# Patient Record
Sex: FEMALE | Race: WHITE | NOT HISPANIC OR LATINO | Employment: OTHER | URBAN - METROPOLITAN AREA
[De-identification: names, ages, dates, MRNs, and addresses within clinical notes are randomized per-mention and may not be internally consistent; named-entity substitution may affect disease eponyms.]

---

## 2017-07-10 DIAGNOSIS — Z12.31 VISIT FOR SCREENING MAMMOGRAM: Primary | ICD-10-CM

## 2017-07-18 ENCOUNTER — HOSPITAL ENCOUNTER (OUTPATIENT)
Dept: RADIOLOGY | Facility: HOSPITAL | Age: 67
Discharge: HOME | End: 2017-07-18
Attending: OBSTETRICS & GYNECOLOGY
Payer: MEDICARE

## 2017-07-18 VITALS — HEIGHT: 62 IN | BODY MASS INDEX: 34.96 KG/M2 | WEIGHT: 190 LBS

## 2017-07-18 DIAGNOSIS — Z12.31 VISIT FOR SCREENING MAMMOGRAM: ICD-10-CM

## 2017-07-18 PROCEDURE — 77067 SCR MAMMO BI INCL CAD: CPT | Mod: TC

## 2017-07-18 PROCEDURE — 77063 BREAST TOMOSYNTHESIS BI: CPT | Mod: 26,,, | Performed by: RADIOLOGY

## 2017-07-18 PROCEDURE — 77067 SCR MAMMO BI INCL CAD: CPT | Mod: 26,,, | Performed by: RADIOLOGY

## 2018-08-28 DIAGNOSIS — Z12.31 VISIT FOR SCREENING MAMMOGRAM: Primary | ICD-10-CM

## 2018-10-15 ENCOUNTER — HOSPITAL ENCOUNTER (OUTPATIENT)
Dept: RADIOLOGY | Facility: HOSPITAL | Age: 68
Discharge: HOME | End: 2018-10-15
Attending: OBSTETRICS & GYNECOLOGY
Payer: MEDICARE

## 2018-10-15 DIAGNOSIS — Z12.31 VISIT FOR SCREENING MAMMOGRAM: ICD-10-CM

## 2018-10-15 PROCEDURE — 77063 BREAST TOMOSYNTHESIS BI: CPT | Mod: 26,,, | Performed by: RADIOLOGY

## 2018-10-15 PROCEDURE — 77063 BREAST TOMOSYNTHESIS BI: CPT | Mod: TC

## 2018-10-15 PROCEDURE — 77067 SCR MAMMO BI INCL CAD: CPT | Mod: 26,,, | Performed by: RADIOLOGY

## 2018-10-15 PROCEDURE — 77067 SCR MAMMO BI INCL CAD: CPT | Mod: TC

## 2019-09-18 DIAGNOSIS — Z12.39 BREAST SCREENING: Primary | ICD-10-CM

## 2019-10-17 ENCOUNTER — HOSPITAL ENCOUNTER (OUTPATIENT)
Dept: RADIOLOGY | Facility: HOSPITAL | Age: 69
Discharge: HOME | End: 2019-10-17
Attending: OBSTETRICS & GYNECOLOGY
Payer: MEDICARE

## 2019-10-17 VITALS — WEIGHT: 190.06 LBS | BODY MASS INDEX: 34.76 KG/M2

## 2019-10-17 DIAGNOSIS — Z12.39 BREAST SCREENING: ICD-10-CM

## 2019-10-17 PROCEDURE — 77067 MAMMO DIGITAL SCREENING BILAT WITH CAD: ICD-10-PCS | Mod: 26,,, | Performed by: RADIOLOGY

## 2019-10-17 PROCEDURE — 77067 SCR MAMMO BI INCL CAD: CPT | Mod: TC

## 2019-10-17 PROCEDURE — 77067 SCR MAMMO BI INCL CAD: CPT | Mod: 26,,, | Performed by: RADIOLOGY

## 2020-10-02 DIAGNOSIS — Z12.31 ENCOUNTER FOR SCREENING MAMMOGRAM FOR MALIGNANT NEOPLASM OF BREAST: Primary | ICD-10-CM

## 2020-10-22 ENCOUNTER — HOSPITAL ENCOUNTER (OUTPATIENT)
Dept: RADIOLOGY | Facility: HOSPITAL | Age: 70
Discharge: HOME | End: 2020-10-22
Payer: MEDICARE

## 2020-10-22 VITALS — WEIGHT: 190 LBS | BODY MASS INDEX: 34.75 KG/M2

## 2020-10-22 DIAGNOSIS — Z12.31 ENCOUNTER FOR SCREENING MAMMOGRAM FOR MALIGNANT NEOPLASM OF BREAST: ICD-10-CM

## 2020-10-22 PROCEDURE — 77067 SCR MAMMO BI INCL CAD: CPT | Mod: TC

## 2020-10-22 PROCEDURE — 77063 MAMMO DIGITAL SCREENING BILAT WITH TOMO: ICD-10-PCS | Mod: 26,,, | Performed by: RADIOLOGY

## 2020-10-22 PROCEDURE — 77063 BREAST TOMOSYNTHESIS BI: CPT | Mod: 26,,, | Performed by: RADIOLOGY

## 2020-10-22 PROCEDURE — 77067 MAMMO DIGITAL SCREENING BILAT WITH TOMO: ICD-10-PCS | Mod: 26,,, | Performed by: RADIOLOGY

## 2020-10-22 PROCEDURE — 77067 SCR MAMMO BI INCL CAD: CPT | Mod: 26,,, | Performed by: RADIOLOGY

## 2021-09-23 DIAGNOSIS — Z12.31 ENCOUNTER FOR SCREENING MAMMOGRAM FOR MALIGNANT NEOPLASM OF BREAST: Primary | ICD-10-CM

## 2021-09-24 ENCOUNTER — TELEPHONE (OUTPATIENT)
Dept: OBSTETRICS AND GYNECOLOGY | Facility: CLINIC | Age: 71
End: 2021-09-24

## 2021-11-02 ENCOUNTER — HOSPITAL ENCOUNTER (OUTPATIENT)
Dept: RADIOLOGY | Facility: HOSPITAL | Age: 71
Discharge: HOME | End: 2021-11-02
Attending: INTERNAL MEDICINE
Payer: MEDICARE

## 2021-11-02 DIAGNOSIS — Z12.31 ENCOUNTER FOR SCREENING MAMMOGRAM FOR MALIGNANT NEOPLASM OF BREAST: ICD-10-CM

## 2021-11-02 PROCEDURE — 77067 SCR MAMMO BI INCL CAD: CPT | Mod: TC

## 2021-11-02 PROCEDURE — 77063 MAMMO DIGITAL SCREENING BILAT WITH TOMO: ICD-10-PCS | Mod: 26,,, | Performed by: RADIOLOGY

## 2021-11-02 PROCEDURE — 77067 SCR MAMMO BI INCL CAD: CPT | Mod: 26,,, | Performed by: RADIOLOGY

## 2021-11-02 PROCEDURE — 77067 MAMMO DIGITAL SCREENING BILAT WITH TOMO: ICD-10-PCS | Mod: 26,,, | Performed by: RADIOLOGY

## 2021-11-02 PROCEDURE — 77063 BREAST TOMOSYNTHESIS BI: CPT | Mod: 26,,, | Performed by: RADIOLOGY

## 2022-09-16 LAB — NONINV COLON CA DNA+OCC BLD SCRN STL QL: NEGATIVE

## 2022-11-08 LAB
LEFT EYE DM RETINOPATHY: NEGATIVE
RIGHT EYE DM RETINOPATHY: NEGATIVE

## 2022-11-09 ENCOUNTER — HOSPITAL ENCOUNTER (OUTPATIENT)
Dept: RADIOLOGY | Facility: HOSPITAL | Age: 72
Discharge: HOME | End: 2022-11-09
Payer: MEDICARE

## 2022-11-09 VITALS — WEIGHT: 185 LBS | HEIGHT: 61 IN | BODY MASS INDEX: 34.93 KG/M2

## 2022-11-09 DIAGNOSIS — Z12.31 BREAST CANCER SCREENING BY MAMMOGRAM: ICD-10-CM

## 2022-11-09 PROCEDURE — 77067 MAMMO DIGITAL SCREENING BILAT WITH TOMO: ICD-10-PCS | Mod: 26,,, | Performed by: RADIOLOGY

## 2022-11-09 PROCEDURE — 77063 MAMMO DIGITAL SCREENING BILAT WITH TOMO: ICD-10-PCS | Mod: 26,,, | Performed by: RADIOLOGY

## 2022-11-09 PROCEDURE — 77067 SCR MAMMO BI INCL CAD: CPT | Mod: 26,,, | Performed by: RADIOLOGY

## 2022-11-09 PROCEDURE — 77063 BREAST TOMOSYNTHESIS BI: CPT | Mod: TC

## 2022-11-09 PROCEDURE — 77063 BREAST TOMOSYNTHESIS BI: CPT | Mod: 26,,, | Performed by: RADIOLOGY

## 2022-12-30 ENCOUNTER — TELEPHONE (OUTPATIENT)
Dept: PRIMARY CARE CLINIC | Facility: CLINIC | Age: 72
End: 2022-12-30

## 2022-12-30 RX ORDER — PRAVASTATIN SODIUM 40 MG/1
TABLET ORAL
Status: CANCELLED | OUTPATIENT
Start: 2022-12-30

## 2022-12-30 RX ORDER — PRAVASTATIN SODIUM 40 MG/1
TABLET ORAL
COMMUNITY
Start: 2021-12-07 | End: 2024-02-14

## 2022-12-30 NOTE — TELEPHONE ENCOUNTER
----- Message from Gloria Neri sent at 12/29/2022  3:03 PM CST -----  Contact: pt  Type:  RX Refill Request  Second Request For Meds       Who Called:  pt  Refill or New Rx:  refill  RX Name and Strength:  pravaston   How is the patient currently taking it? (ex. 1XDay):  as order  Is this a 30 day or 90 day RX:  90  Preferred Pharmacy with phone number:    [id number] [place], [place] - [address] AT [address]   Phone:  [phone]  Fax:  [phone]        Local or Mail Order:  local  Ordering Provider:  rosalva Acosta Call Back Number:  [phone]    Additional Information:

## 2022-12-30 NOTE — TELEPHONE ENCOUNTER
----- Message from Gloria Neri sent at 12/29/2022  3:03 PM CST -----  Contact: pt  Type:  RX Refill Request  Second Request For Meds       Who Called:  pt  Refill or New Rx:  refill  RX Name and Strength:  pravaston   How is the patient currently taking it? (ex. 1XDay):  as order  Is this a 30 day or 90 day RX:  90  Preferred Pharmacy with phone number:    [id number] [place], [place] - [address] AT [address]   Phone:  [phone]  Fax:  [phone]        Local or Mail Order:  local  Ordering Provider:  rosalva Acosta Call Back Number:  [phone]    Additional Information:

## 2023-01-10 ENCOUNTER — OFFICE VISIT (OUTPATIENT)
Dept: PRIMARY CARE CLINIC | Facility: CLINIC | Age: 73
End: 2023-01-10
Payer: MEDICARE

## 2023-01-10 VITALS
TEMPERATURE: 98 F | HEIGHT: 61 IN | OXYGEN SATURATION: 97 % | DIASTOLIC BLOOD PRESSURE: 64 MMHG | HEART RATE: 65 BPM | BODY MASS INDEX: 36.82 KG/M2 | WEIGHT: 195 LBS | SYSTOLIC BLOOD PRESSURE: 128 MMHG

## 2023-01-10 DIAGNOSIS — E11.00 DM HYPEROSMOLARITY TYPE II, UNCONTROLLED: ICD-10-CM

## 2023-01-10 DIAGNOSIS — E11.65 INADEQUATELY CONTROLLED DIABETES MELLITUS: ICD-10-CM

## 2023-01-10 DIAGNOSIS — N18.31 TYPE 2 DIABETES MELLITUS WITH STAGE 3A CHRONIC KIDNEY DISEASE, WITHOUT LONG-TERM CURRENT USE OF INSULIN: ICD-10-CM

## 2023-01-10 DIAGNOSIS — E11.22 TYPE 2 DIABETES MELLITUS WITH STAGE 3A CHRONIC KIDNEY DISEASE, WITHOUT LONG-TERM CURRENT USE OF INSULIN: ICD-10-CM

## 2023-01-10 DIAGNOSIS — I10 ESSENTIAL HYPERTENSION, BENIGN: Primary | ICD-10-CM

## 2023-01-10 DIAGNOSIS — E78.2 MIXED HYPERLIPIDEMIA: ICD-10-CM

## 2023-01-10 PROCEDURE — 99214 PR OFFICE/OUTPT VISIT, EST, LEVL IV, 30-39 MIN: ICD-10-PCS | Mod: S$GLB,,, | Performed by: INTERNAL MEDICINE

## 2023-01-10 PROCEDURE — 99214 OFFICE O/P EST MOD 30 MIN: CPT | Mod: S$GLB,,, | Performed by: INTERNAL MEDICINE

## 2023-01-10 RX ORDER — LOSARTAN POTASSIUM 50 MG/1
50 TABLET ORAL DAILY
COMMUNITY

## 2023-01-10 RX ORDER — EZETIMIBE 10 MG/1
10 TABLET ORAL NIGHTLY
COMMUNITY
End: 2023-03-01 | Stop reason: SDUPTHER

## 2023-01-10 RX ORDER — DULAGLUTIDE 1.5 MG/.5ML
1.5 INJECTION, SOLUTION SUBCUTANEOUS
Qty: 12 PEN | Refills: 3 | Status: SHIPPED | OUTPATIENT
Start: 2023-01-10 | End: 2023-04-18

## 2023-01-10 RX ORDER — METOPROLOL SUCCINATE 100 MG/1
100 TABLET, EXTENDED RELEASE ORAL DAILY
COMMUNITY
End: 2023-09-13 | Stop reason: SDUPTHER

## 2023-01-10 RX ORDER — TORSEMIDE 20 MG/1
10 TABLET ORAL DAILY
COMMUNITY

## 2023-01-10 RX ORDER — PHENTERMINE HYDROCHLORIDE 37.5 MG/1
37.5 TABLET ORAL
Qty: 30 TABLET | Refills: 0 | Status: SHIPPED | OUTPATIENT
Start: 2023-01-10 | End: 2023-01-12 | Stop reason: SDUPTHER

## 2023-01-10 RX ORDER — METFORMIN HYDROCHLORIDE 500 MG/1
500 TABLET, EXTENDED RELEASE ORAL
COMMUNITY
End: 2023-01-20 | Stop reason: SDUPTHER

## 2023-01-10 RX ORDER — ROPINIROLE 4 MG/1
4 TABLET, FILM COATED ORAL NIGHTLY
COMMUNITY
End: 2023-09-05 | Stop reason: SDUPTHER

## 2023-01-10 RX ORDER — LEVOTHYROXINE SODIUM 75 UG/1
75 TABLET ORAL
COMMUNITY
End: 2023-06-07 | Stop reason: SDUPTHER

## 2023-01-10 RX ORDER — POTASSIUM CHLORIDE 20 MEQ/1
20 TABLET, EXTENDED RELEASE ORAL 2 TIMES DAILY
COMMUNITY
End: 2023-09-13 | Stop reason: SDUPTHER

## 2023-01-10 NOTE — ASSESSMENT & PLAN NOTE
T/c changing SGL-2 inhibitor to farxiga if insurance covers  Diet with less salt d/w pt  Dec use of NSAIDs, inc intake of fluids,...

## 2023-01-10 NOTE — PROGRESS NOTES
Subjective:       Patient ID: Linda Gannon Mucha is a 72 y.o. female.    Chief Complaint: Medication Refill (Patient presents for medication clarity. No other complaints at this time.)      Medication Refill  This is a chronic problem. The current episode started more than 1 year ago. The problem has been gradually improving. Associated symptoms include arthralgias and myalgias. Pertinent negatives include no fever. Treatments tried: GLP-1 agonist. The treatment provided significant relief.   Diabetes  She presents for her follow-up diabetic visit. She has type 2 diabetes mellitus. No MedicAlert identification noted. The initial diagnosis of diabetes was made 10 years ago. Her disease course has been improving. There are no hypoglycemic associated symptoms. There are no hypoglycemic complications. Symptoms are improving. Diabetic complications include nephropathy. Risk factors for coronary artery disease include diabetes mellitus, dyslipidemia, obesity, hypertension, sedentary lifestyle and stress. Current diabetic treatment includes oral agent (dual therapy) (Ozempic, mounjaro).   Review of Systems   Constitutional:  Negative for activity change, fever and unexpected weight change.   Respiratory: Negative.     Cardiovascular: Negative.    Musculoskeletal:  Positive for arthralgias and myalgias.   Neurological: Negative.        Objective:      Physical Exam  Constitutional:       Appearance: Normal appearance. She is obese.   Cardiovascular:      Rate and Rhythm: Normal rate and regular rhythm.   Pulmonary:      Effort: Pulmonary effort is normal.      Breath sounds: Normal breath sounds.   Musculoskeletal:         General: Swelling: of knees.   Neurological:      Mental Status: She is alert.       Assessment:       1. Inadequately controlled diabetes mellitus  Overview:  In general , A1c is under good control but she suffers from diabetic nephropathy    Assessment & Plan:  T/c changing SGL-2 inhibitor to farxiga if  insurance covers  Diet with less salt d/w pt  Dec use of NSAIDs, inc intake of fluids,...      Other orders  -     dulaglutide (TRULICITY) 1.5 mg/0.5 mL pen injector; Inject 1.5 mg into the skin every 7 days.  Dispense: 12 pen; Refill: 3  -     phentermine (ADIPEX-P) 37.5 mg tablet; Take 1 tablet (37.5 mg total) by mouth before breakfast.  Dispense: 30 tablet; Refill: 0             Plan:       1. Inadequately controlled diabetes mellitus  Overview:  In general , A1c is under good control but she suffers from diabetic nephropathy    Assessment & Plan:  T/c changing SGL-2 inhibitor to farxiga if insurance covers  Diet with less salt d/w pt  Dec use of NSAIDs, inc intake of fluids,...      Other orders  -     dulaglutide (TRULICITY) 1.5 mg/0.5 mL pen injector; Inject 1.5 mg into the skin every 7 days.  Dispense: 12 pen; Refill: 3  -     phentermine (ADIPEX-P) 37.5 mg tablet; Take 1 tablet (37.5 mg total) by mouth before breakfast.  Dispense: 30 tablet; Refill: 0

## 2023-01-11 ENCOUNTER — PATIENT MESSAGE (OUTPATIENT)
Dept: ADMINISTRATIVE | Facility: HOSPITAL | Age: 73
End: 2023-01-11
Payer: MEDICARE

## 2023-01-12 RX ORDER — PHENTERMINE HYDROCHLORIDE 37.5 MG/1
37.5 TABLET ORAL
Qty: 30 TABLET | Refills: 0 | Status: SHIPPED | OUTPATIENT
Start: 2023-01-12 | End: 2023-02-11

## 2023-01-20 ENCOUNTER — LAB VISIT (OUTPATIENT)
Dept: LAB | Facility: CLINIC | Age: 73
End: 2023-01-20
Payer: MEDICARE

## 2023-01-20 DIAGNOSIS — E11.22 TYPE 2 DIABETES MELLITUS WITH STAGE 3A CHRONIC KIDNEY DISEASE, WITHOUT LONG-TERM CURRENT USE OF INSULIN: ICD-10-CM

## 2023-01-20 DIAGNOSIS — E11.00 DM HYPEROSMOLARITY TYPE II, UNCONTROLLED: ICD-10-CM

## 2023-01-20 DIAGNOSIS — I10 ESSENTIAL HYPERTENSION, BENIGN: ICD-10-CM

## 2023-01-20 DIAGNOSIS — N18.31 TYPE 2 DIABETES MELLITUS WITH STAGE 3A CHRONIC KIDNEY DISEASE, WITHOUT LONG-TERM CURRENT USE OF INSULIN: ICD-10-CM

## 2023-01-20 DIAGNOSIS — E78.2 MIXED HYPERLIPIDEMIA: ICD-10-CM

## 2023-01-20 LAB
ALBUMIN SERPL BCP-MCNC: 4 G/DL (ref 3.5–5.2)
ALBUMIN/CREAT UR: 14.2 UG/MG (ref 0–30)
ALP SERPL-CCNC: 139 U/L (ref 55–135)
ALT SERPL W/O P-5'-P-CCNC: 31 U/L (ref 10–44)
ANION GAP SERPL CALC-SCNC: 9 MMOL/L (ref 8–16)
AST SERPL-CCNC: 18 U/L (ref 10–40)
BILIRUB SERPL-MCNC: 1 MG/DL (ref 0.1–1)
BUN SERPL-MCNC: 27 MG/DL (ref 8–23)
CALCIUM SERPL-MCNC: 9.6 MG/DL (ref 8.7–10.5)
CHLORIDE SERPL-SCNC: 104 MMOL/L (ref 95–110)
CHOLEST SERPL-MCNC: 159 MG/DL (ref 120–199)
CHOLEST/HDLC SERPL: 2.4 {RATIO} (ref 2–5)
CO2 SERPL-SCNC: 26 MMOL/L (ref 23–29)
CREAT SERPL-MCNC: 1.1 MG/DL (ref 0.5–1.4)
CREAT UR-MCNC: 106 MG/DL (ref 15–325)
EST. GFR  (NO RACE VARIABLE): 52.7 ML/MIN/1.73 M^2
ESTIMATED AVG GLUCOSE: 137 MG/DL (ref 68–131)
GLUCOSE SERPL-MCNC: 94 MG/DL (ref 70–110)
HBA1C MFR BLD: 6.4 % (ref 4–5.6)
HDLC SERPL-MCNC: 66 MG/DL (ref 40–75)
HDLC SERPL: 41.5 % (ref 20–50)
LDLC SERPL CALC-MCNC: 70.4 MG/DL (ref 63–159)
MICROALBUMIN UR DL<=1MG/L-MCNC: 15 UG/ML
NONHDLC SERPL-MCNC: 93 MG/DL
POTASSIUM SERPL-SCNC: 4.2 MMOL/L (ref 3.5–5.1)
PROT SERPL-MCNC: 7.9 G/DL (ref 6–8.4)
SODIUM SERPL-SCNC: 139 MMOL/L (ref 136–145)
TRIGL SERPL-MCNC: 113 MG/DL (ref 30–150)

## 2023-01-20 PROCEDURE — 36415 COLL VENOUS BLD VENIPUNCTURE: CPT | Mod: ,,, | Performed by: STUDENT IN AN ORGANIZED HEALTH CARE EDUCATION/TRAINING PROGRAM

## 2023-01-20 PROCEDURE — 80061 LIPID PANEL: CPT | Performed by: INTERNAL MEDICINE

## 2023-01-20 PROCEDURE — 83036 HEMOGLOBIN GLYCOSYLATED A1C: CPT | Performed by: INTERNAL MEDICINE

## 2023-01-20 PROCEDURE — 82570 ASSAY OF URINE CREATININE: CPT | Performed by: INTERNAL MEDICINE

## 2023-01-20 PROCEDURE — 80053 COMPREHEN METABOLIC PANEL: CPT | Performed by: INTERNAL MEDICINE

## 2023-01-20 PROCEDURE — 36415 PR COLLECTION VENOUS BLOOD,VENIPUNCTURE: ICD-10-PCS | Mod: ,,, | Performed by: STUDENT IN AN ORGANIZED HEALTH CARE EDUCATION/TRAINING PROGRAM

## 2023-01-20 RX ORDER — METFORMIN HYDROCHLORIDE 500 MG/1
500 TABLET, EXTENDED RELEASE ORAL
Qty: 90 TABLET | Refills: 3 | Status: SHIPPED | OUTPATIENT
Start: 2023-01-20 | End: 2023-09-13 | Stop reason: SDUPTHER

## 2023-01-20 NOTE — TELEPHONE ENCOUNTER
----- Message from Dorita Flores sent at 1/19/2023  3:10 PM CST -----  Contact: pt  Type:  RX Refill Request    Who Called:  pharm  Refill or New Rx:  new  RX Name and Strength:    metFORMIN (GLUCOPHAGE-XR) 500 MG ER 24hr tablet  How is the patient currently taking it? (ex. 1XDay):  as ordered  Is this a 30 day or 90 day RX:  90  Preferred Pharmacy with phone number:  [facility] - [address] [address]  Phone: [phone] Fax: [phone]    Local or Mail Order:  mail    Ordering Provider:  rosalva Acosta Call Back Number:  [phone]  Additional Information:  they are needing new rx due to provider moved to new location

## 2023-03-01 DIAGNOSIS — E78.2 MIXED HYPERLIPIDEMIA: Primary | ICD-10-CM

## 2023-03-01 RX ORDER — EZETIMIBE 10 MG/1
10 TABLET ORAL NIGHTLY
Qty: 90 TABLET | Refills: 1 | Status: SHIPPED | OUTPATIENT
Start: 2023-03-01 | End: 2023-03-02 | Stop reason: SDUPTHER

## 2023-03-01 NOTE — TELEPHONE ENCOUNTER
----- Message from Deana Soto sent at 3/1/2023  9:04 AM CST -----  Contact: pharm  Type:  RX Refill Request    Who [facility]  REF#[id number]    Refill or New Rx:  refill-- they are requesting medication within 48hrs please they are asking     RX Name and Strength:      1. ezetimibe (ZETIA) 10 mg tablet     No  Sig - Route: Take 10 mg by mouth every evening. - Oral      How is the patient currently taking it? (ex. 1XDay):  as order  Is this a 30 day or 90 day RX:  as order  Preferred Pharmacy with phone number:      [facility] - [address] [address]  Phone: [phone] Fax: [phone]          Local or Mail Order:  mail  Ordering Provider:  becka Acosta Call Back Number:  [phone]  Additional Information:

## 2023-03-02 DIAGNOSIS — E78.2 MIXED HYPERLIPIDEMIA: ICD-10-CM

## 2023-03-02 RX ORDER — EZETIMIBE 10 MG/1
10 TABLET ORAL NIGHTLY
Qty: 90 TABLET | Refills: 1 | Status: SHIPPED | OUTPATIENT
Start: 2023-03-02 | End: 2023-08-15 | Stop reason: SDUPTHER

## 2023-03-02 NOTE — TELEPHONE ENCOUNTER
----- Message from Julieta Keyes sent at 3/2/2023  9:08 AM CST -----  Contact: patient  Type:  Needs Medical Advice    Who Called: Patient      Pharmacy name and phone #:        [facility] #[id number] - [place], [place] - [address] AT [address]  [address]  [place] [address]  Phone: [phone] Fax: [phone]      Would the patient rather a call back or a response via [facility]? Call    Best Call Back Number: [phone]    Additional Information: Patient is requesting a short term refill for the meds since she has already been two weeks without it and is waiting on a mail in service to mail them. Please call to advise       Med in question   ezetimibe (ZETIA) 10 mg tablet

## 2023-03-03 ENCOUNTER — OFFICE VISIT (OUTPATIENT)
Dept: PRIMARY CARE CLINIC | Facility: CLINIC | Age: 73
End: 2023-03-03
Payer: MEDICARE

## 2023-03-03 ENCOUNTER — TELEPHONE (OUTPATIENT)
Dept: PRIMARY CARE CLINIC | Facility: CLINIC | Age: 73
End: 2023-03-03

## 2023-03-03 VITALS
SYSTOLIC BLOOD PRESSURE: 136 MMHG | BODY MASS INDEX: 36.56 KG/M2 | OXYGEN SATURATION: 96 % | DIASTOLIC BLOOD PRESSURE: 74 MMHG | WEIGHT: 193.63 LBS | TEMPERATURE: 99 F | HEART RATE: 75 BPM | HEIGHT: 61 IN

## 2023-03-03 DIAGNOSIS — N18.31 CHRONIC KIDNEY DISEASE (CKD) STAGE G3A/A1, MODERATELY DECREASED GLOMERULAR FILTRATION RATE (GFR) BETWEEN 45-59 ML/MIN/1.73 SQUARE METER AND ALBUMINURIA CREATININE RATIO LESS THAN 30 MG/G: ICD-10-CM

## 2023-03-03 DIAGNOSIS — Z11.59 ENCOUNTER FOR HEPATITIS C SCREENING TEST FOR LOW RISK PATIENT: ICD-10-CM

## 2023-03-03 DIAGNOSIS — R53.82 CHRONIC FATIGUE: ICD-10-CM

## 2023-03-03 DIAGNOSIS — J06.9 VIRAL UPPER RESPIRATORY TRACT INFECTION: ICD-10-CM

## 2023-03-03 DIAGNOSIS — E66.01 SEVERE OBESITY (BMI 35.0-39.9) WITH COMORBIDITY: Primary | ICD-10-CM

## 2023-03-03 PROCEDURE — 99213 PR OFFICE/OUTPT VISIT, EST, LEVL III, 20-29 MIN: ICD-10-PCS | Mod: S$GLB,,, | Performed by: INTERNAL MEDICINE

## 2023-03-03 PROCEDURE — 99213 OFFICE O/P EST LOW 20 MIN: CPT | Mod: S$GLB,,, | Performed by: INTERNAL MEDICINE

## 2023-03-03 RX ORDER — ALBUTEROL SULFATE 90 UG/1
2 AEROSOL, METERED RESPIRATORY (INHALATION) EVERY 6 HOURS PRN
COMMUNITY
Start: 2023-02-25 | End: 2023-04-24

## 2023-03-03 RX ORDER — DOXYCYCLINE 100 MG/1
100 CAPSULE ORAL 2 TIMES DAILY
COMMUNITY
Start: 2023-02-25 | End: 2023-04-24

## 2023-03-03 RX ORDER — ONDANSETRON 8 MG/1
8 TABLET, ORALLY DISINTEGRATING ORAL EVERY 6 HOURS PRN
Qty: 20 TABLET | Refills: 0 | Status: SHIPPED | OUTPATIENT
Start: 2023-03-03 | End: 2023-03-08

## 2023-03-03 RX ORDER — COVID-19 VACCINE, MRNA 0.2 MG/ML
INJECTION, SUSPENSION INTRAMUSCULAR
COMMUNITY
Start: 2022-06-25

## 2023-03-03 RX ORDER — DICLOFENAC SODIUM 10 MG/G
GEL TOPICAL
COMMUNITY

## 2023-03-03 RX ORDER — ACETAMINOPHEN 500 MG
5000 TABLET ORAL
COMMUNITY

## 2023-03-03 RX ORDER — PHENAZOPYRIDINE HYDROCHLORIDE 200 MG/1
TABLET, FILM COATED ORAL
COMMUNITY
Start: 2022-07-22 | End: 2023-04-24

## 2023-03-03 RX ORDER — MELOXICAM 7.5 MG/1
TABLET ORAL
COMMUNITY
End: 2023-04-24

## 2023-03-03 RX ORDER — FUROSEMIDE 40 MG/1
40 TABLET ORAL
COMMUNITY
End: 2023-09-13

## 2023-03-03 RX ORDER — NAPROXEN SODIUM 220 MG
220 TABLET ORAL
COMMUNITY
End: 2023-04-24

## 2023-03-03 RX ORDER — MUPIROCIN 20 MG/G
OINTMENT TOPICAL
COMMUNITY
End: 2023-12-13 | Stop reason: SDUPTHER

## 2023-03-03 RX ORDER — ASPIRIN 81 MG/1
81 TABLET ORAL
COMMUNITY
End: 2023-04-24

## 2023-03-03 NOTE — TELEPHONE ENCOUNTER
----- Message from Dorita Flores sent at 3/3/2023  8:06 AM CST -----  Contact: pt  Type: Needs Medical Advice         Who Called: pt  Best Call Back Number:[phone]  Additional Information: Requesting a call back regarding pt is asking to be seen. Pt declined the next available. Pt would like a call back please   Please Advise- Thank you

## 2023-03-03 NOTE — ASSESSMENT & PLAN NOTE
Viral syndrome  Increase fluid intake  I added Zofran  I asked to use Imodium p.r.n.  Continue rest Advil or Aleve as needed or tolerated

## 2023-03-03 NOTE — ASSESSMENT & PLAN NOTE
GFR reviewed with patient improved into the low 50s  She has no stage IIIA  Discussed fluid we discussed decreasing the use of nonsteroidals with diet modification specially limitation of salt intake

## 2023-03-09 NOTE — TELEPHONE ENCOUNTER
----- Message from Jaja Car sent at 3/9/2023  9:43 AM CST -----  Contact: pt  Type:  RX Refill Request    Who Called: PT  Refill or New Rx:REFILL  RX Name and Strength:potassium chloride SA (K-DUR,KLOR-CON) 20 MEQ tablet  How is the patient currently taking it? (ex. 1XDay):TWICE A DAY  Is this a 30 day or 90 day RX:90  Preferred Pharmacy with phone number:  [facility] #[id number] - [place], [place] - [address]  [address]  [place] [address]  Phone: [phone] Fax: [phone]      Local or Mail Order:LOCAL  Ordering Provider:KASIA  Would the patient rather a call back or a response via [facility]? CALL   Best Call Back Number: [phone]  Additional Information: THANK YOU

## 2023-03-20 RX ORDER — POTASSIUM CHLORIDE 20 MEQ/1
20 TABLET, EXTENDED RELEASE ORAL 2 TIMES DAILY
OUTPATIENT
Start: 2023-03-20

## 2023-04-05 ENCOUNTER — PATIENT MESSAGE (OUTPATIENT)
Dept: LAB | Facility: HOSPITAL | Age: 73
End: 2023-04-05
Payer: MEDICARE

## 2023-04-06 ENCOUNTER — LAB VISIT (OUTPATIENT)
Dept: LAB | Facility: CLINIC | Age: 73
End: 2023-04-06
Payer: MEDICARE

## 2023-04-06 DIAGNOSIS — R53.82 CHRONIC FATIGUE: ICD-10-CM

## 2023-04-06 DIAGNOSIS — Z11.59 ENCOUNTER FOR HEPATITIS C SCREENING TEST FOR LOW RISK PATIENT: ICD-10-CM

## 2023-04-06 DIAGNOSIS — E66.01 SEVERE OBESITY (BMI 35.0-39.9) WITH COMORBIDITY: ICD-10-CM

## 2023-04-06 DIAGNOSIS — N18.31 CHRONIC KIDNEY DISEASE (CKD) STAGE G3A/A1, MODERATELY DECREASED GLOMERULAR FILTRATION RATE (GFR) BETWEEN 45-59 ML/MIN/1.73 SQUARE METER AND ALBUMINURIA CREATININE RATIO LESS THAN 30 MG/G: ICD-10-CM

## 2023-04-06 LAB
ANION GAP SERPL CALC-SCNC: 10 MMOL/L (ref 8–16)
BUN SERPL-MCNC: 25 MG/DL (ref 8–23)
CALCIUM SERPL-MCNC: 9.6 MG/DL (ref 8.7–10.5)
CHLORIDE SERPL-SCNC: 103 MMOL/L (ref 95–110)
CO2 SERPL-SCNC: 23 MMOL/L (ref 23–29)
CREAT SERPL-MCNC: 1 MG/DL (ref 0.5–1.4)
EST. GFR  (NO RACE VARIABLE): 59.1 ML/MIN/1.73 M^2
GLUCOSE SERPL-MCNC: 107 MG/DL (ref 70–110)
POTASSIUM SERPL-SCNC: 4.3 MMOL/L (ref 3.5–5.1)
SODIUM SERPL-SCNC: 136 MMOL/L (ref 136–145)
TSH SERPL DL<=0.005 MIU/L-ACNC: 0.88 UIU/ML (ref 0.4–4)

## 2023-04-06 PROCEDURE — 36415 COLL VENOUS BLD VENIPUNCTURE: CPT | Mod: ,,, | Performed by: STUDENT IN AN ORGANIZED HEALTH CARE EDUCATION/TRAINING PROGRAM

## 2023-04-06 PROCEDURE — 80048 BASIC METABOLIC PNL TOTAL CA: CPT | Performed by: INTERNAL MEDICINE

## 2023-04-06 PROCEDURE — 36415 PR COLLECTION VENOUS BLOOD,VENIPUNCTURE: ICD-10-PCS | Mod: ,,, | Performed by: STUDENT IN AN ORGANIZED HEALTH CARE EDUCATION/TRAINING PROGRAM

## 2023-04-06 PROCEDURE — 86803 HEPATITIS C AB TEST: CPT | Performed by: INTERNAL MEDICINE

## 2023-04-06 PROCEDURE — 84443 ASSAY THYROID STIM HORMONE: CPT | Performed by: INTERNAL MEDICINE

## 2023-04-07 LAB — HCV AB SERPL QL IA: NORMAL

## 2023-04-11 ENCOUNTER — PATIENT MESSAGE (OUTPATIENT)
Dept: ADMINISTRATIVE | Facility: HOSPITAL | Age: 73
End: 2023-04-11
Payer: MEDICARE

## 2023-04-13 ENCOUNTER — PATIENT OUTREACH (OUTPATIENT)
Dept: ADMINISTRATIVE | Facility: HOSPITAL | Age: 73
End: 2023-04-13
Payer: MEDICARE

## 2023-04-18 ENCOUNTER — OFFICE VISIT (OUTPATIENT)
Dept: PRIMARY CARE CLINIC | Facility: CLINIC | Age: 73
End: 2023-04-18
Payer: MEDICARE

## 2023-04-18 DIAGNOSIS — E11.65 INADEQUATELY CONTROLLED DIABETES MELLITUS: ICD-10-CM

## 2023-04-18 PROCEDURE — 99213 PR OFFICE/OUTPT VISIT, EST, LEVL III, 20-29 MIN: ICD-10-PCS | Mod: 95,,, | Performed by: INTERNAL MEDICINE

## 2023-04-18 PROCEDURE — 99213 OFFICE O/P EST LOW 20 MIN: CPT | Mod: 95,,, | Performed by: INTERNAL MEDICINE

## 2023-04-18 RX ORDER — DULAGLUTIDE 3 MG/.5ML
3 INJECTION, SOLUTION SUBCUTANEOUS
Qty: 4 PEN | Refills: 11 | Status: SHIPPED | OUTPATIENT
Start: 2023-04-18 | End: 2023-05-24

## 2023-04-18 NOTE — PROGRESS NOTES
Subjective:       Patient ID: Linda Gannon Mucha is a 72 y.o. female.    Chief Complaint: Diabetes and Obesity      She presents via tele audio video to discuss diabetes ,weight management, CKD    Diabetes  She presents for her follow-up diabetic visit. She has type 2 diabetes mellitus. The initial diagnosis of diabetes was made 20 years ago. Her disease course has been improving. There are no diabetic associated symptoms. There are no hypoglycemic complications. Symptoms are stable. There are no diabetic complications. Risk factors for coronary artery disease include family history, dyslipidemia, diabetes mellitus, obesity, hypertension, post-menopausal, sedentary lifestyle and stress. Current diabetic treatment includes oral agent (monotherapy) (GLP-1 agonist). She is compliant with treatment all of the time. Her home blood glucose trend is decreasing steadily.   Flank Pain  Pertinent negatives include no fever.   Review of Systems   Constitutional:  Negative for activity change, fever and unexpected weight change.   Respiratory: Negative.     Cardiovascular: Negative.    Genitourinary:  Positive for flank pain.   Neurological: Negative.        Objective:      Physical Exam  : deferred due to tele  Assessment:       1. Inadequately controlled diabetes mellitus  Overview:  In general , A1c is under good control but she suffers from diabetic nephropathy    Assessment & Plan:  Audio Only Telehealth Visit     The patient location is: home  The chief complaint leading to consultation is: obesity  Visit type: Virtual visit with audio only (telephone)  Total time spent with patient: 12     The reason for the audio only service rather than synchronous audio and video virtual visit was related to technical difficulties or patient preference/necessity.     Each patient to whom I provide medical services by telemedicine is:  (1) informed of the relationship between the physician and patient and the respective role of any  other health care provider with respect to management of the patient; and (2) notified that they may decline to receive medical services by telemedicine and may withdraw from such care at any time. Patient verbally consented to receive this service via voice-only telephone call.       HPI: type II DM , obesity     Assessment and plan:  Inc trulicity to 3mg diet d/w pt, exercise                                       Plan:       1. Inadequately controlled diabetes mellitus  Overview:  In general , A1c is under good control but she suffers from diabetic nephropathy    Assessment & Plan:  Audio Only Telehealth Visit     The patient location is: home  The chief complaint leading to consultation is: obesity  Visit type: Virtual visit with audio only (telephone)  Total time spent with patient: 12     The reason for the audio only service rather than synchronous audio and video virtual visit was related to technical difficulties or patient preference/necessity.     Each patient to whom I provide medical services by telemedicine is:  (1) informed of the relationship between the physician and patient and the respective role of any other health care provider with respect to management of the patient; and (2) notified that they may decline to receive medical services by telemedicine and may withdraw from such care at any time. Patient verbally consented to receive this service via audio-video.       HPI: type II DM , obesity     Assessment and plan:  Inc trulicity to 3mg diet d/w pt, exercise

## 2023-04-18 NOTE — ASSESSMENT & PLAN NOTE
Audio Video Telehealth Visit     The patient location is: home  The chief complaint leading to consultation is: obesity  Visit type: Virtual visit with audio / video  Total time spent with patient: 12     The reason for the audio only service rather than synchronous audio and video virtual visit was related to technical difficulties or patient preference/necessity.     Each patient to whom I provide medical services by telemedicine is:  (1) informed of the relationship between the physician and patient and the respective role of any other health care provider with respect to management of the patient; and (2) notified that they may decline to receive medical services by telemedicine and may withdraw from such care at any time. Patient verbally consented to receive this service via audio - video       HPI: type II DM , obesity     Assessment and plan:  Inc trulicity to 3mg diet d/w pt, exercise

## 2023-05-24 ENCOUNTER — OFFICE VISIT (OUTPATIENT)
Dept: FAMILY MEDICINE | Facility: CLINIC | Age: 73
End: 2023-05-24
Payer: MEDICARE

## 2023-05-24 VITALS
WEIGHT: 184.63 LBS | SYSTOLIC BLOOD PRESSURE: 108 MMHG | DIASTOLIC BLOOD PRESSURE: 62 MMHG | HEART RATE: 68 BPM | OXYGEN SATURATION: 97 % | BODY MASS INDEX: 34.86 KG/M2 | HEIGHT: 61 IN

## 2023-05-24 DIAGNOSIS — Z00.00 PREVENTATIVE HEALTH CARE: ICD-10-CM

## 2023-05-24 DIAGNOSIS — R42 DIZZINESS: ICD-10-CM

## 2023-05-24 DIAGNOSIS — E11.65 INADEQUATELY CONTROLLED DIABETES MELLITUS: ICD-10-CM

## 2023-05-24 DIAGNOSIS — I10 ESSENTIAL HYPERTENSION, BENIGN: ICD-10-CM

## 2023-05-24 DIAGNOSIS — H53.9 VISION ABNORMALITIES: ICD-10-CM

## 2023-05-24 DIAGNOSIS — E11.9 DIABETES MELLITUS WITHOUT COMPLICATION: Primary | ICD-10-CM

## 2023-05-24 DIAGNOSIS — N18.31 CHRONIC KIDNEY DISEASE (CKD) STAGE G3A/A1, MODERATELY DECREASED GLOMERULAR FILTRATION RATE (GFR) BETWEEN 45-59 ML/MIN/1.73 SQUARE METER AND ALBUMINURIA CREATININE RATIO LESS THAN 30 MG/G: ICD-10-CM

## 2023-05-24 DIAGNOSIS — E78.2 MIXED HYPERLIPIDEMIA: ICD-10-CM

## 2023-05-24 DIAGNOSIS — R09.89 BRUIT (ARTERIAL): ICD-10-CM

## 2023-05-24 PROCEDURE — 99214 OFFICE O/P EST MOD 30 MIN: CPT | Mod: PBBFAC,PN | Performed by: INTERNAL MEDICINE

## 2023-05-24 PROCEDURE — 99999 PR PBB SHADOW E&M-EST. PATIENT-LVL IV: CPT | Mod: PBBFAC,,, | Performed by: INTERNAL MEDICINE

## 2023-05-24 PROCEDURE — 99999 PR PBB SHADOW E&M-EST. PATIENT-LVL IV: ICD-10-PCS | Mod: PBBFAC,,, | Performed by: INTERNAL MEDICINE

## 2023-05-24 PROCEDURE — 99214 OFFICE O/P EST MOD 30 MIN: CPT | Mod: S$PBB,,, | Performed by: INTERNAL MEDICINE

## 2023-05-24 PROCEDURE — 99214 PR OFFICE/OUTPT VISIT, EST, LEVL IV, 30-39 MIN: ICD-10-PCS | Mod: S$PBB,,, | Performed by: INTERNAL MEDICINE

## 2023-05-24 RX ORDER — DULAGLUTIDE 3 MG/.5ML
3 INJECTION, SOLUTION SUBCUTANEOUS
Qty: 12 PEN | Refills: 3 | Status: SHIPPED | OUTPATIENT
Start: 2023-05-24 | End: 2023-12-13

## 2023-05-24 RX ORDER — ONDANSETRON 8 MG/1
8 TABLET, ORALLY DISINTEGRATING ORAL 2 TIMES DAILY
Qty: 20 TABLET | Refills: 0 | Status: SHIPPED | OUTPATIENT
Start: 2023-05-24 | End: 2023-06-03

## 2023-05-24 NOTE — ASSESSMENT & PLAN NOTE
Rule out paroxysmal atrial fibrillation  Rule out carotid artery disease  I am going to order a carotid ultrasound  To consider referring to cardiology for Holter monitor echocardiogram possible DILLON

## 2023-05-24 NOTE — ASSESSMENT & PLAN NOTE
COVID : got it at [facility] on [address] in [place] : 10/10/22 & 5/22/23  Tdap : given today

## 2023-05-24 NOTE — PROGRESS NOTES
Subjective:       Patient ID: Linda Gannon Mucha is a 72 y.o. female.    Chief Complaint: Diabetes      Patient is established already .......... presents today for a f/u visit , to discuss labs results and for management of the chronic conditions.        Diabetes  She presents for her follow-up diabetic visit. She has type 2 diabetes mellitus. No MedicAlert identification noted. The initial diagnosis of diabetes was made 15 years ago. Her disease course has been improving. There are no hypoglycemic associated symptoms. Associated symptoms include visual change. There are no hypoglycemic complications. Symptoms are worsening. Diabetic complications include nephropathy. Risk factors for coronary artery disease include diabetes mellitus, dyslipidemia, family history, obesity, hypertension, post-menopausal, sedentary lifestyle and stress. Current diabetic treatment includes diet and oral agent (dual therapy) (Trulicity). Her home blood glucose trend is decreasing steadily.   Review of Systems   Constitutional:  Negative for activity change, fever and unexpected weight change.   Respiratory: Negative.     Cardiovascular: Negative.    Neurological: Negative.        Objective:      Physical Exam  Vitals and nursing note reviewed.   Constitutional:       Appearance: Normal appearance. She is obese.   Cardiovascular:      Rate and Rhythm: Normal rate and regular rhythm.   Pulmonary:      Effort: Pulmonary effort is normal.      Breath sounds: Normal breath sounds.   Musculoskeletal:      Cervical back: Normal range of motion and neck supple.   Neurological:      Mental Status: She is alert.       Assessment:       1. Diabetes mellitus without complication    2. Preventative health care  Overview:  See below    Assessment & Plan:  COVID : got it at [facility] on [address] in [place] : 10/10/22 & 5/22/23        3. Inadequately controlled diabetes mellitus  Overview:  In general , A1c is under good control but she suffers from diabetic  nephropathy  GFR improved  I discussed renal condition with patient at length  We discussed diet modification specifically decreasing salt intake, avoiding fast food ,avoiding fried food  We also discussed the importance of minimizing the use of anti-inflammatory agents like Motrin ibuprofen naproxen Aleve etc.  We discussed the importance of increasing fluid intake specifically water and also stay hydrated  We discussed minimizing the use of diuretics as much as clinically possible  We are going to monitor renal functions BUN creatinine urine microalbumin and electrolytes closely  To consider referral to Nephrology Service if renal fx continue to decline  To consider adding an SGL- 2 inhibitor if renal functions continue to decline, regardless of the blood sugar status      Assessment & Plan:  Inc trulicity to 3mg  Diet, wt loss d/w her in details    Orders:  -     Hemoglobin A1C; Standing    4. Essential hypertension, benign  -     Comprehensive Metabolic Panel; Future; Expected date: 05/24/2023  -     Microalbumin/Creatinine Ratio, Urine; Future; Expected date: 05/24/2023    5. Mixed hyperlipidemia  -     Lipid Panel; Future; Expected date: 05/24/2023    6. Vision abnormalities  Overview:  Patient has been seen flashes of light in her visual field  She was seen by eye doctor and she was told her eye exam were normal  No history of TIA  No history of strokes  No double vision no headaches no nausea or vomiting no weakness of the extremities    Assessment & Plan:  Rule out paroxysmal atrial fibrillation  Rule out carotid artery disease  I am going to order a carotid ultrasound  To consider referring to cardiology for Holter monitor echocardiogram possible DILLON    Orders:  -     US Carotid Bilateral; Future; Expected date: 05/24/2023    7. Dizziness  -     US Carotid Bilateral; Future; Expected date: 05/24/2023    8. Bruit (arterial)  -     US Carotid Bilateral; Future; Expected date: 05/24/2023    9. Chronic kidney  disease (CKD) stage G3a/A1, moderately decreased glomerular filtration rate (GFR) between 45-59 mL/min/1.73 square meter and albuminuria creatinine ratio less than 30 mg/g  Overview:  With stage III A chronic kidney disease  Improved    Assessment & Plan:  Continue to monitor  To consider adding Farxiga      Other orders  -     dulaglutide (TRULICITY) 3 mg/0.5 mL pen injector; Inject 3 mg into the skin every 7 days.  Dispense: 12 pen; Refill: 3  -     ondansetron (ZOFRAN-ODT) 8 MG TbDL; Take 1 tablet (8 mg total) by mouth 2 (two) times a day. for 20 doses  Dispense: 20 tablet; Refill: 0  -     diphth,pertus,acell,,tetanus (BOOSTRIX) 2.5-8-5 Lf-mcg-Lf/0.5mL Susp; Inject 0.5 mLs into the muscle once. for 1 dose  Dispense: 0.5 mL; Refill: 0             Plan:       1. Diabetes mellitus without complication    2. Preventative health care  Overview:  See below    Assessment & Plan:  COVID : got it at [facility] on [address] in [place] : 10/10/22 & 5/22/23        3. Inadequately controlled diabetes mellitus  Overview:  In general , A1c is under good control but she suffers from diabetic nephropathy  GFR improved  I discussed renal condition with patient at length  We discussed diet modification specifically decreasing salt intake, avoiding fast food ,avoiding fried food  We also discussed the importance of minimizing the use of anti-inflammatory agents like Motrin ibuprofen naproxen Aleve etc.  We discussed the importance of increasing fluid intake specifically water and also stay hydrated  We discussed minimizing the use of diuretics as much as clinically possible  We are going to monitor renal functions BUN creatinine urine microalbumin and electrolytes closely  To consider referral to Nephrology Service if renal fx continue to decline  To consider adding an SGL- 2 inhibitor if renal functions continue to decline, regardless of the blood sugar status      Assessment & Plan:  Inc trulicity to 3mg  Diet, wt loss d/w her in  details    Orders:  -     Hemoglobin A1C; Standing    4. Essential hypertension, benign  -     Comprehensive Metabolic Panel; Future; Expected date: 05/24/2023  -     Microalbumin/Creatinine Ratio, Urine; Future; Expected date: 05/24/2023    5. Mixed hyperlipidemia  -     Lipid Panel; Future; Expected date: 05/24/2023    6. Vision abnormalities  Overview:  Patient has been seen flashes of light in her visual field  She was seen by eye doctor and she was told her eye exam were normal  No history of TIA  No history of strokes  No double vision no headaches no nausea or vomiting no weakness of the extremities    Assessment & Plan:  Rule out paroxysmal atrial fibrillation  Rule out carotid artery disease  I am going to order a carotid ultrasound  To consider referring to cardiology for Holter monitor echocardiogram possible DILLON    Orders:  -     US Carotid Bilateral; Future; Expected date: 05/24/2023    7. Dizziness  -     US Carotid Bilateral; Future; Expected date: 05/24/2023    8. Bruit (arterial)  -     US Carotid Bilateral; Future; Expected date: 05/24/2023    9. Chronic kidney disease (CKD) stage G3a/A1, moderately decreased glomerular filtration rate (GFR) between 45-59 mL/min/1.73 square meter and albuminuria creatinine ratio less than 30 mg/g  Overview:  With stage III A chronic kidney disease  Improved    Assessment & Plan:  Continue to monitor  To consider adding Farxiga      Other orders  -     dulaglutide (TRULICITY) 3 mg/0.5 mL pen injector; Inject 3 mg into the skin every 7 days.  Dispense: 12 pen; Refill: 3  -     ondansetron (ZOFRAN-ODT) 8 MG TbDL; Take 1 tablet (8 mg total) by mouth 2 (two) times a day. for 20 doses  Dispense: 20 tablet; Refill: 0  -     diphth,pertus,acell,,tetanus (BOOSTRIX) 2.5-8-5 Lf-mcg-Lf/0.5mL Susp; Inject 0.5 mLs into the muscle once. for 1 dose  Dispense: 0.5 mL; Refill: 0

## 2023-05-25 ENCOUNTER — PATIENT OUTREACH (OUTPATIENT)
Dept: ADMINISTRATIVE | Facility: HOSPITAL | Age: 73
End: 2023-05-25
Payer: MEDICARE

## 2023-05-25 NOTE — PROGRESS NOTES

## 2023-05-30 ENCOUNTER — LAB VISIT (OUTPATIENT)
Dept: LAB | Facility: HOSPITAL | Age: 73
End: 2023-05-30
Payer: MEDICARE

## 2023-05-30 DIAGNOSIS — E78.2 MIXED HYPERLIPIDEMIA: ICD-10-CM

## 2023-05-30 DIAGNOSIS — E11.65 INADEQUATELY CONTROLLED DIABETES MELLITUS: ICD-10-CM

## 2023-05-30 DIAGNOSIS — I10 ESSENTIAL HYPERTENSION, BENIGN: ICD-10-CM

## 2023-05-30 LAB
ALBUMIN SERPL BCP-MCNC: 4.1 G/DL (ref 3.5–5.2)
ALBUMIN/CREAT UR: 11 UG/MG (ref 0–30)
ALP SERPL-CCNC: 124 U/L (ref 55–135)
ALT SERPL W/O P-5'-P-CCNC: 27 U/L (ref 10–44)
ANION GAP SERPL CALC-SCNC: 10 MMOL/L (ref 8–16)
AST SERPL-CCNC: 17 U/L (ref 10–40)
BILIRUB SERPL-MCNC: 0.9 MG/DL (ref 0.1–1)
BUN SERPL-MCNC: 25 MG/DL (ref 8–23)
CALCIUM SERPL-MCNC: 10.1 MG/DL (ref 8.7–10.5)
CHLORIDE SERPL-SCNC: 103 MMOL/L (ref 95–110)
CHOLEST SERPL-MCNC: 178 MG/DL (ref 120–199)
CHOLEST/HDLC SERPL: 2.7 {RATIO} (ref 2–5)
CO2 SERPL-SCNC: 24 MMOL/L (ref 23–29)
CREAT SERPL-MCNC: 0.9 MG/DL (ref 0.5–1.4)
CREAT UR-MCNC: 73 MG/DL (ref 15–325)
EST. GFR  (NO RACE VARIABLE): >60 ML/MIN/1.73 M^2
ESTIMATED AVG GLUCOSE: 114 MG/DL (ref 68–131)
GLUCOSE SERPL-MCNC: 95 MG/DL (ref 70–110)
HBA1C MFR BLD: 5.6 % (ref 4–5.6)
HDLC SERPL-MCNC: 65 MG/DL (ref 40–75)
HDLC SERPL: 36.5 % (ref 20–50)
LDLC SERPL CALC-MCNC: 82 MG/DL (ref 63–159)
MICROALBUMIN UR DL<=1MG/L-MCNC: 8 UG/ML
NONHDLC SERPL-MCNC: 113 MG/DL
POTASSIUM SERPL-SCNC: 4.5 MMOL/L (ref 3.5–5.1)
PROT SERPL-MCNC: 7.8 G/DL (ref 6–8.4)
SODIUM SERPL-SCNC: 137 MMOL/L (ref 136–145)
TRIGL SERPL-MCNC: 155 MG/DL (ref 30–150)

## 2023-05-30 PROCEDURE — 80061 LIPID PANEL: CPT | Performed by: INTERNAL MEDICINE

## 2023-05-30 PROCEDURE — 83036 HEMOGLOBIN GLYCOSYLATED A1C: CPT | Performed by: INTERNAL MEDICINE

## 2023-05-30 PROCEDURE — 80053 COMPREHEN METABOLIC PANEL: CPT | Performed by: INTERNAL MEDICINE

## 2023-05-30 PROCEDURE — 82570 ASSAY OF URINE CREATININE: CPT | Performed by: INTERNAL MEDICINE

## 2023-05-30 PROCEDURE — 36415 COLL VENOUS BLD VENIPUNCTURE: CPT | Performed by: INTERNAL MEDICINE

## 2023-06-07 ENCOUNTER — PATIENT OUTREACH (OUTPATIENT)
Dept: ADMINISTRATIVE | Facility: HOSPITAL | Age: 73
End: 2023-06-07
Payer: MEDICARE

## 2023-06-07 ENCOUNTER — OFFICE VISIT (OUTPATIENT)
Dept: FAMILY MEDICINE | Facility: CLINIC | Age: 73
End: 2023-06-07
Payer: MEDICARE

## 2023-06-07 VITALS
HEART RATE: 84 BPM | SYSTOLIC BLOOD PRESSURE: 126 MMHG | DIASTOLIC BLOOD PRESSURE: 72 MMHG | WEIGHT: 184.06 LBS | HEIGHT: 61 IN | BODY MASS INDEX: 34.75 KG/M2 | OXYGEN SATURATION: 96 %

## 2023-06-07 DIAGNOSIS — Z00.00 PREVENTATIVE HEALTH CARE: ICD-10-CM

## 2023-06-07 DIAGNOSIS — Z23 NEED FOR PROPHYLACTIC VACCINATION AGAINST STREPTOCOCCUS PNEUMONIAE (PNEUMOCOCCUS): Primary | ICD-10-CM

## 2023-06-07 DIAGNOSIS — I10 ESSENTIAL HYPERTENSION, BENIGN: ICD-10-CM

## 2023-06-07 DIAGNOSIS — N18.2 CHRONIC KIDNEY DISEASE, STAGE II (MILD): ICD-10-CM

## 2023-06-07 DIAGNOSIS — E11.9 DIABETES MELLITUS WITHOUT COMPLICATION: ICD-10-CM

## 2023-06-07 DIAGNOSIS — H53.9 VISION ABNORMALITIES: ICD-10-CM

## 2023-06-07 PROBLEM — J06.9 VIRAL UPPER RESPIRATORY TRACT INFECTION: Status: RESOLVED | Noted: 2023-03-03 | Resolved: 2023-06-07

## 2023-06-07 PROBLEM — E11.65 INADEQUATELY CONTROLLED DIABETES MELLITUS: Status: RESOLVED | Noted: 2023-01-10 | Resolved: 2023-06-07

## 2023-06-07 PROBLEM — N18.31 CHRONIC KIDNEY DISEASE (CKD) STAGE G3A/A1, MODERATELY DECREASED GLOMERULAR FILTRATION RATE (GFR) BETWEEN 45-59 ML/MIN/1.73 SQUARE METER AND ALBUMINURIA CREATININE RATIO LESS THAN 30 MG/G: Status: RESOLVED | Noted: 2023-03-03 | Resolved: 2023-06-07

## 2023-06-07 PROBLEM — E66.01 SEVERE OBESITY (BMI 35.0-39.9) WITH COMORBIDITY: Status: RESOLVED | Noted: 2023-03-03 | Resolved: 2023-06-07

## 2023-06-07 PROCEDURE — 99213 OFFICE O/P EST LOW 20 MIN: CPT | Mod: PBBFAC,PN,25 | Performed by: INTERNAL MEDICINE

## 2023-06-07 PROCEDURE — 90677 PCV20 VACCINE IM: CPT | Mod: PBBFAC,PN

## 2023-06-07 PROCEDURE — 99215 PR OFFICE/OUTPT VISIT, EST, LEVL V, 40-54 MIN: ICD-10-PCS | Mod: S$PBB,,, | Performed by: INTERNAL MEDICINE

## 2023-06-07 PROCEDURE — 99999 PR PBB SHADOW E&M-EST. PATIENT-LVL III: CPT | Mod: PBBFAC,,, | Performed by: INTERNAL MEDICINE

## 2023-06-07 PROCEDURE — 99215 OFFICE O/P EST HI 40 MIN: CPT | Mod: S$PBB,,, | Performed by: INTERNAL MEDICINE

## 2023-06-07 PROCEDURE — 99999 PR PBB SHADOW E&M-EST. PATIENT-LVL III: ICD-10-PCS | Mod: PBBFAC,,, | Performed by: INTERNAL MEDICINE

## 2023-06-07 RX ORDER — ASPIRIN 81 MG/1
81 TABLET ORAL DAILY
COMMUNITY

## 2023-06-07 RX ORDER — LEVOTHYROXINE SODIUM 75 UG/1
75 TABLET ORAL
Qty: 90 TABLET | Refills: 2 | Status: SHIPPED | OUTPATIENT
Start: 2023-06-07 | End: 2023-09-13 | Stop reason: SDUPTHER

## 2023-06-07 NOTE — ASSESSMENT & PLAN NOTE
Cont jardiance,   I discussed renal condition with patient at length  We discussed diet modification specifically decreasing salt intake, avoiding fast food ,avoiding fried food  We also discussed the importance of minimizing the use of anti-inflammatory agents like Motrin ibuprofen naproxen Aleve etc.  We discussed the importance of increasing fluid intake specifically water and also stay hydrated  We discussed minimizing the use of diuretics as much as clinically possible  We are going to monitor renal functions BUN creatinine urine microalbumin and electrolytes closely  To consider referral to Nephrology Service if renal fx continue to decline  To consider adding an SGL- 2 inhibitor if renal functions continue to decline, regardless of the blood sugar status

## 2023-06-07 NOTE — PROGRESS NOTES
Population Health Chart Review & Patient Outreach Details:     Reason for Outreach Encounter:     [x]  Non-Compliant Report   []  Payor Report ([employer], PHN, BCBS, MSSP, MCIP, C, etc.)   []  Pre-Visit Chart Review     Updates Requested / Reviewed:     []  Care Everywhere    []     []  External Sources ([facility], [facility], DIS, etc.)   []  Care Team Updated    Patient Outreach Method:    []  Telephone Outreach Completed   [] Successful   [] Left Voicemail   [] Unable to Contact (wrong number, no voicemail)  []  [email or web address] Portal Outreach Sent  []  Letter Outreach Mailed  [x]  Fax Sent for External Records  []  External Records Upload    Health Maintenance Topics Addressed and Outreach Outcomes / Actions Taken:        []      Breast Cancer Screening []  Mammo Scheduled      []  External Records Requested     []  Added Reminder to Complete to Upcoming Primary Care Appt Notes     []  Patient Declined     []  Patient Will Call Back to Schedule     []  Patient Will Schedule with External Provider / Order Routed if Applicable             []       Cervical Cancer Screening []  Pap Scheduled      []  External Records Requested     []  Added Reminder to Complete to Upcoming Primary Care Appt Notes     []  Patient Declined     []  Patient Will Call Back to Schedule     []  Patient Will Schedule with External Provider               []          Colorectal Cancer Screening []  Colonoscopy Case Request or Referral Placed     []  External Records Requested     []  Added Reminder to Complete to Upcoming Primary Care Appt Notes     []  Patient Declined     []  Patient Will Call Back to Schedule     []  Patient Will Schedule with External Provider     []  Fit Kit Mailed (add the SmartPhrase under additional notes)     []  Reminded Patient to Complete Home Test             [x]      Diabetic Eye Exam []  Eye Camera Scheduled or Optometry Referral Placed     [x]  External Records Requested     []  Added Reminder to Complete to  Upcoming Primary Care Appt Notes     []  Patient Declined     []  Patient Will Call Back to Schedule     []  Patient Will Schedule with External Provider             []      Blood Pressure Control []  Primary Care Follow Up Visit Scheduled     []  Remote Blood Pressure Reading Captured     []  Added Reminder to Complete to Upcoming Primary Care Appt Notes     []  Patient Declined     []  Patient Will Call Back / Patient Will Send Portal Message with Reading     []  Patient Will Call Back to Schedule Provider Visit             []       HbA1c & Other Labs []  Lab Appt Scheduled for Due Labs     []  Primary Care Follow Up Visit Scheduled      []  Reminded Patient to Complete Home Test     []  Added Reminder to Complete to Upcoming Primary Care Appt Notes     []  Patient Declined     []  Patient Will Call Back to Schedule     []  Patient Will Schedule with External Provider / Order Routed if Applicable           []    Schedule Primary Care Appt []  Primary Care Appt Scheduled     []  Patient Declined     []  Patient Will Call Back to Schedule     []  Pt Established with External Provider & Updated Care Team             []      Medication Adherence []  Primary Care Appointment Scheduled     []  Added Reminder to Upcoming Primary Care Appt Notes     []  Patient Reminded to  Prescription     []  Patient Declined, Provider Notified if Needed     []  Sent Provider Message to Review and/or Add Exclusion to Problem List             []      Osteoporosis Screening []  DXA Appointment Scheduled     []  External Records Requested     []  Added Reminder to Complete to Upcoming Primary Care Appt Notes     []  Patient Declined     []  Patient Will Call Back to Schedule     []  Patient Will Schedule with External Provider / Order Routed if Applicable     Additional Care Coordinator Notes:     06/07/2023  EFAX SENT TO [facility] FOR MOST RECENT DM EYE EXAM RESULTS    Further Action Needed If Patient Returns Outreach:

## 2023-06-07 NOTE — PROGRESS NOTES
Subjective:       Patient ID: Linda Gannon Mucha is a 72 y.o. female.    Chief Complaint: Follow-up      Patient is established already .......... presents today for a f/u visit , to discuss labs results and for management of the chronic conditions.        Follow-up  Pertinent negatives include no fever.   Diabetes  She presents for her follow-up diabetic visit. She has type 2 diabetes mellitus. No MedicAlert identification noted. The initial diagnosis of diabetes was made 20 years ago. Her disease course has been improving. There are no hypoglycemic associated symptoms. Associated symptoms include blurred vision. There are no hypoglycemic complications. Symptoms are stable. There are no diabetic complications. Risk factors for coronary artery disease include dyslipidemia, diabetes mellitus, family history, hypertension, obesity, post-menopausal and sedentary lifestyle. Current diabetic treatment includes oral agent (dual therapy) (GLP-1 agonist). She is compliant with treatment all of the time. She is following a generally healthy diet. Meal planning includes avoidance of concentrated sweets and carbohydrate counting. She has not had a previous visit with a dietitian. She participates in exercise intermittently. Her home blood glucose trend is decreasing steadily. An ACE inhibitor/angiotensin II receptor blocker is being taken. She does not see a podiatrist.Eye exam is current.   Review of Systems   Constitutional:  Negative for activity change, fever and unexpected weight change.   Eyes:  Positive for blurred vision.   Respiratory: Negative.     Cardiovascular: Negative.    Genitourinary: Negative.    Neurological: Negative.        Objective:      Physical Exam  Vitals and nursing note reviewed.   Constitutional:       Appearance: Normal appearance. She is obese.   Eyes:      General:         Left eye: Left eye discharge: prev.  Cardiovascular:      Rate and Rhythm: Normal rate and regular rhythm.      Pulses:            Dorsalis pedis pulses are 2+ on the right side and 2+ on the left side.        Posterior tibial pulses are 2+ on the right side and 2+ on the left side.   Pulmonary:      Effort: Pulmonary effort is normal.      Breath sounds: Normal breath sounds.   Musculoskeletal:      Cervical back: Normal range of motion and neck supple.   Feet:      Right foot:      Protective Sensation: 10 sites tested.  10 sites sensed.      Skin integrity: Skin integrity normal.      Left foot:      Protective Sensation: 10 sites tested.  10 sites sensed.      Skin integrity: Skin integrity normal.   Neurological:      Mental Status: She is alert.       Assessment:   Type 2 diabetes:  Much improved.  A1c decreased to under 6%  Continue efforts at exercise and diet modification  Continue her Trulicity 3 mg weekly her metformin and her Jardiance  Monitor closely    Chronic kidney disease:  Secondary to diabetes  GFR much improved  Now over 60, 67 to be specific    Preventive measure:  Get I examined report from [facility] in [place]  We discussed healthy eating and increase exercise  Patient was given Prevnar 20 today  I recommended she get the Shingrix vaccine again    History of visual field abnormalities:  Rule out cardiac source of emboli       Plan:       Continue efforts at exercise and diet modification  Continue her Trulicity 3 mg weekly her metformin and her Jardiance  Monitor closely    I discussed renal condition with patient at length  We discussed diet modification specifically decreasing salt intake, avoiding fast food ,avoiding fried food  We also discussed the importance of minimizing the use of anti-inflammatory agents like Motrin ibuprofen naproxen Aleve etc.  We discussed the importance of increasing fluid intake specifically water and also stay hydrated  We discussed minimizing the use of diuretics as much as clinically possible  We are going to monitor renal functions BUN creatinine urine microalbumin and  electrolytes closely  To consider referral to Nephrology Service if renal fx continue to decline  To consider adding an SGL- 2 inhibitor if renal functions continue to decline, regardless of the blood sugar status    Possible cardiac source for visual field defects  She was told by her optometrist that there is no signs of retinal disease  Referred to cardiology for cardiac workup    I spent 48 minutes with the patient discussing all of the above

## 2023-06-07 NOTE — LETTER
FAX      AUTHORIZATION FOR RELEASE OF   CONFIDENTIAL INFORMATION        [facility] [place], [place]      We are seeing Linda Gannon Mucha, date of birth 1950, in the clinic at [facility]. Steve Manrique MD is the patient's PCP. Linda Gannon Mucha has an outstanding lab/procedure at the time we reviewed their chart. In order to help keep their health information updated, Linda Gannon Mucha has authorized us to request the following medical record(s):        DIABETIC EYE EXAM WITH RETINOL SCREENING   (MOST RECENT WITHIN 48 MONTHS)              Please fax records to Steve Manrique MD at [facility]  [phone].     If you have any questions, please contact Jacinta at [phone].    Jacinta Griffin LPN  Performance Improvement Coordinator  [facility]  [address] [place] [address]  [phone]        Patient Name: Linda Gannon Mucha  : 1950  Patient Phone #: [phone]

## 2023-07-17 NOTE — TELEPHONE ENCOUNTER
Chidi Mcdaniel,  Please review this medication refill request for this patient of .  Last office visit: 6/7/2023.  Thank you.  Signed:  Jarad Enriquez LPN    ----- Message from Julieta Keyes sent at 7/17/2023 11:50 AM CDT -----  Contact: David  Type:  RX Refill Request    Who Called: Patient    Refill or New Rx:refill    RX Name and Strength:empagliflozin (JARDIANCE) 10 mg tablet      How is the patient currently taking it? (ex. 1XDay):1 x day    Is this a 30 day or 90 day RX:90    Preferred Pharmacy with phone number:    [id number] - [place], [place] - [address]  [address]  [place] [address]  Phone: [phone] Fax: [phone]      Local or Mail Order:local    Ordering Provider:rosalva    Would the patient rather a call back or a response via [facility]? Call    Best Call Back Number:[phone] (home)     Additional Information: Please refill and advise

## 2023-08-15 DIAGNOSIS — E78.2 MIXED HYPERLIPIDEMIA: ICD-10-CM

## 2023-08-15 RX ORDER — EZETIMIBE 10 MG/1
10 TABLET ORAL NIGHTLY
Qty: 90 TABLET | Refills: 3 | Status: SHIPPED | OUTPATIENT
Start: 2023-08-15 | End: 2024-08-14

## 2023-08-16 DIAGNOSIS — E11.9 TYPE 2 DIABETES MELLITUS WITHOUT COMPLICATION, UNSPECIFIED WHETHER LONG TERM INSULIN USE: ICD-10-CM

## 2023-08-21 ENCOUNTER — PATIENT MESSAGE (OUTPATIENT)
Dept: ADMINISTRATIVE | Facility: HOSPITAL | Age: 73
End: 2023-08-21
Payer: MEDICARE

## 2023-08-28 PROBLEM — Z00.00 PREVENTATIVE HEALTH CARE: Status: RESOLVED | Noted: 2023-05-24 | Resolved: 2023-08-28

## 2023-09-05 RX ORDER — ROPINIROLE 4 MG/1
4 TABLET, FILM COATED ORAL NIGHTLY
Qty: 90 TABLET | Refills: 3 | Status: SHIPPED | OUTPATIENT
Start: 2023-09-05 | End: 2024-09-04

## 2023-09-05 NOTE — TELEPHONE ENCOUNTER
Last office visit: 6/7/2023  ----- Message from Jaja Car sent at 9/5/2023  9:11 AM CDT -----  Contact: PT  Type:  RX Refill Request    Who Called: PT   Refill or New Rx:REFILL   RX Name and Strength: rOPINIRole (REQUIP) 4 MG tablet  How is the patient currently taking it? (ex. 1XDay):ONE TAB AT BEDTIME   Is this a 30 day or 90 day RX: 90  Preferred Pharmacy with phone number:   [id number] - [place], [place] - [address]  [address]  [place] [address]  Phone: [phone] Fax: [phone]  Local or Mail Order:LOCAL  Ordering Provider:MARIA FERNANDA  Would the patient rather a call back or a response via [facility]? CALL   Best Call Back Number:[phone] (home)   Additional Information: PLEASE CALL PT TO CONFIRM WHEN THE MEDICATION IS SENT, THANK YOU

## 2023-09-07 RX ORDER — ROPINIROLE 4 MG/1
4 TABLET, FILM COATED ORAL NIGHTLY
Qty: 90 TABLET | Refills: 3 | OUTPATIENT
Start: 2023-09-07 | End: 2024-09-06

## 2023-09-07 NOTE — TELEPHONE ENCOUNTER
Chidi Duvall,  Please review this Rx request 2 weeks of this medication. Previous Rx was sent to [facility] and pt is totally out of Requip 4 mg.  Last office visit: 6/7/2023  Please review and advise.  Thank you.  Signed:  Jarad Enriquez LPN    ----- Message from Dorita Flores sent at 9/6/2023  9:19 AM CDT -----  Contact: pt  Type: Needs Medical Advice         Who Called: pt  Best Call Back Number:[phone]  Additional Information: Requesting a call back regarding  Pt has mail order for Rx. They will take 2 weeks to send rx to pt and pt is out of her  rOPINIRole (REQUIP) 4 MG tablet pt asking for a 2week supply to be sent to her local pharm.       [id number] - [place], [place] - [address]  [address]  [place] [address]  Phone: [phone] Fax: [phone]      Please Advise- Thank you       
Imaging Studies/Medications

## 2023-09-12 ENCOUNTER — CLINICAL SUPPORT (OUTPATIENT)
Dept: FAMILY MEDICINE | Facility: CLINIC | Age: 73
End: 2023-09-12
Payer: MEDICARE

## 2023-09-12 DIAGNOSIS — I10 ESSENTIAL HYPERTENSION, BENIGN: ICD-10-CM

## 2023-09-12 DIAGNOSIS — E11.9 DIABETES MELLITUS WITHOUT COMPLICATION: ICD-10-CM

## 2023-09-12 LAB
ALBUMIN SERPL BCP-MCNC: 4.4 G/DL (ref 3.5–5.2)
ALP SERPL-CCNC: 98 U/L (ref 55–135)
ALT SERPL W/O P-5'-P-CCNC: 24 U/L (ref 10–44)
ANION GAP SERPL CALC-SCNC: 12 MMOL/L (ref 8–16)
AST SERPL-CCNC: 17 U/L (ref 10–40)
BILIRUB SERPL-MCNC: 1.3 MG/DL (ref 0.1–1)
BUN SERPL-MCNC: 33 MG/DL (ref 8–23)
CALCIUM SERPL-MCNC: 9.6 MG/DL (ref 8.7–10.5)
CHLORIDE SERPL-SCNC: 104 MMOL/L (ref 95–110)
CO2 SERPL-SCNC: 24 MMOL/L (ref 23–29)
CREAT SERPL-MCNC: 1.2 MG/DL (ref 0.5–1.4)
EST. GFR  (NO RACE VARIABLE): 47.2 ML/MIN/1.73 M^2
ESTIMATED AVG GLUCOSE: 117 MG/DL (ref 68–131)
GLUCOSE SERPL-MCNC: 120 MG/DL (ref 70–110)
HBA1C MFR BLD: 5.7 % (ref 4–5.6)
POTASSIUM SERPL-SCNC: 4.3 MMOL/L (ref 3.5–5.1)
PROT SERPL-MCNC: 8.4 G/DL (ref 6–8.4)
SODIUM SERPL-SCNC: 140 MMOL/L (ref 136–145)

## 2023-09-12 PROCEDURE — 80053 COMPREHEN METABOLIC PANEL: CPT | Performed by: INTERNAL MEDICINE

## 2023-09-12 PROCEDURE — 83036 HEMOGLOBIN GLYCOSYLATED A1C: CPT | Performed by: INTERNAL MEDICINE

## 2023-09-13 ENCOUNTER — OFFICE VISIT (OUTPATIENT)
Dept: FAMILY MEDICINE | Facility: CLINIC | Age: 73
End: 2023-09-13
Payer: MEDICARE

## 2023-09-13 VITALS
SYSTOLIC BLOOD PRESSURE: 126 MMHG | OXYGEN SATURATION: 95 % | WEIGHT: 186.63 LBS | BODY MASS INDEX: 35.23 KG/M2 | HEIGHT: 61 IN | HEART RATE: 85 BPM | DIASTOLIC BLOOD PRESSURE: 60 MMHG

## 2023-09-13 DIAGNOSIS — I10 ESSENTIAL HYPERTENSION, BENIGN: ICD-10-CM

## 2023-09-13 DIAGNOSIS — N18.31 CHRONIC KIDNEY DISEASE (CKD) STAGE G3A/A1, MODERATELY DECREASED GLOMERULAR FILTRATION RATE (GFR) BETWEEN 45-59 ML/MIN/1.73 SQUARE METER AND ALBUMINURIA CREATININE RATIO LESS THAN 30 MG/G: Primary | ICD-10-CM

## 2023-09-13 DIAGNOSIS — N18.31 TYPE 2 DIABETES MELLITUS WITH STAGE 3A CHRONIC KIDNEY DISEASE, WITHOUT LONG-TERM CURRENT USE OF INSULIN: ICD-10-CM

## 2023-09-13 DIAGNOSIS — E11.22 TYPE 2 DIABETES MELLITUS WITH STAGE 3A CHRONIC KIDNEY DISEASE, WITHOUT LONG-TERM CURRENT USE OF INSULIN: ICD-10-CM

## 2023-09-13 DIAGNOSIS — Z00.00 ENCOUNTER FOR ANNUAL WELLNESS VISIT (AWV) IN MEDICARE PATIENT: ICD-10-CM

## 2023-09-13 PROBLEM — N18.2 CHRONIC KIDNEY DISEASE, STAGE II (MILD): Status: RESOLVED | Noted: 2023-06-07 | Resolved: 2023-09-13

## 2023-09-13 PROBLEM — N18.30 TYPE 2 DIABETES MELLITUS WITH STAGE 3 CHRONIC KIDNEY DISEASE, WITHOUT LONG-TERM CURRENT USE OF INSULIN: Status: ACTIVE | Noted: 2023-09-13

## 2023-09-13 PROCEDURE — G0439 PR MEDICARE ANNUAL WELLNESS SUBSEQUENT VISIT: ICD-10-PCS | Mod: S$GLB,,, | Performed by: INTERNAL MEDICINE

## 2023-09-13 PROCEDURE — G0439 PPPS, SUBSEQ VISIT: HCPCS | Mod: S$GLB,,, | Performed by: INTERNAL MEDICINE

## 2023-09-13 PROCEDURE — 99214 PR OFFICE/OUTPT VISIT, EST, LEVL IV, 30-39 MIN: ICD-10-PCS | Mod: 25,S$GLB,, | Performed by: INTERNAL MEDICINE

## 2023-09-13 PROCEDURE — 99214 OFFICE O/P EST MOD 30 MIN: CPT | Mod: 25,S$GLB,, | Performed by: INTERNAL MEDICINE

## 2023-09-13 RX ORDER — POTASSIUM CHLORIDE 20 MEQ/1
20 TABLET, EXTENDED RELEASE ORAL DAILY
Qty: 90 TABLET | Refills: 3 | Status: SHIPPED | OUTPATIENT
Start: 2023-09-13 | End: 2024-09-12

## 2023-09-13 RX ORDER — METOPROLOL SUCCINATE 100 MG/1
100 TABLET, EXTENDED RELEASE ORAL DAILY
Qty: 90 TABLET | Refills: 3 | Status: SHIPPED | OUTPATIENT
Start: 2023-09-13 | End: 2024-09-12

## 2023-09-13 RX ORDER — METFORMIN HYDROCHLORIDE 500 MG/1
500 TABLET, EXTENDED RELEASE ORAL NIGHTLY
Qty: 90 TABLET | Refills: 3 | Status: SHIPPED | OUTPATIENT
Start: 2023-09-13 | End: 2024-09-12

## 2023-09-13 RX ORDER — LEVOTHYROXINE SODIUM 75 UG/1
75 TABLET ORAL
Qty: 90 TABLET | Refills: 3 | Status: SHIPPED | OUTPATIENT
Start: 2023-09-13 | End: 2023-10-13 | Stop reason: SDUPTHER

## 2023-09-13 NOTE — ASSESSMENT & PLAN NOTE
I discussed renal condition with patient at length  We discussed diet modification specifically decreasing salt intake, avoiding fast food ,avoiding fried food  We also discussed the importance of minimizing the use of anti-inflammatory agents like Motrin ibuprofen naproxen Aleve etc.  We discussed the importance of increasing fluid intake specifically water and also stay hydrated  We discussed minimizing the use of diuretics as much as clinically possible  We are going to monitor renal functions BUN creatinine urine microalbumin and electrolytes closely  To consider referral to Nephrology Service if renal fx continue to decline  To consider adding an SGL- 2 inhibitor if renal functions continue to decline, regardless of the blood sugar status  Dec demadex to 1/2 tab q.o.d.

## 2023-09-13 NOTE — PROGRESS NOTES
Annual Medicare wellness visit :  Reported by patient.  Diet and nutrition:  Healthy diet  Fracture risk:  No history of fractures; no recent explain infection; no sudden unexplained fractures; previous musculoskeletal injuries  Physical activity:  Exercise on a regular basis; recent increase in physical activity; good  Physical condition  Depression risk:  Never feels sad, empty or tearful; no loss of interest in activities; no significant changes in weight; no sleep disturbances or insomnia; no agitation; no loss of energy; no feelings of worthlessness or guilt; no thoughts of suicide; no history of depression; no history of mood disorders  Orientation:  No disorientation to time; no disorientation to date; no disorientation to place  Concentration and memory:  No decreased concentration ability; no memory lapses or loss; does not forget words  Speech/motor difficulties:  No speech difficulties; no difficulty expressing formulated concepts; no difficulty with fine manipulative tasks; no difficulty writing/coping; no slowed reaction time/; does not knock things over with trying to pick them up  Hearing: No loss of hearing  Vision:  No vision problems  Activities of daily living:  Able to bathe with limited or no assistance; able to control urination and bowels; able to dress with limited or no assistance; able to feed herself with limited or no assistance; able to get out of chair or bed with limited or no assistance; able to Groom with limited or no assistance; able to toilet with limited or no assistance  Instrumental activities of daily living: Able to do housework with limited or no assistance; able to grocery shop with limited or no assistance; able to manage medications with limited or no assistance; able to manage money with limited or no assistance; able to prepare meals with limited or no assistance; able to use the phone with limited or no assistance  Falls risk assessment: No frequent falls while  walking; no 4 in the past year; no falls since last visit; no dizziness/vertigo  Home safety:  No unsafe dianne hazards; no unsafe stairs; no unsafe gas appliances; Working smoke/CO detectors; wears protective head gear for biking/high velocity; use of seatbelts; practicing 'safer sex'; no vision or hearing loss while driving; no firearms; has hand bars in the bathroom/shower; good lighting in the home    Review of system:  Patient reports no dry skin, no itching, no abnormal moles, no jaundice, no rashes, no hives, no discoloration, no excessive facial or body hair between bracket hirsutism, no hair thinning, no growth/lesions, and no excessive sweating; patient reports no fever, no chills, no night sweats, no significant weight gain, no significant weight loss, no exercise intolerance  Patient reports normal appetite and no sleep disturbances (insomnia)  Patient reports no dry eyes, no irritation, no pain in the eyes, no visual changes, no floaters, no sensitivity to light between bracket photophobia, no seeing double between bracket diplopia, and  No discharge.  Patient reports no difficulty hearing, no ear pain, no vertigo and no ringing in the ears between bracket tinnitus.  Patient reports no difficulty smelling, no frequent nosebleeds, and no nose/sinus  Problems.  Patient reports no dry mouth, no unusual taste of foods, no mouth ulcers, no bleeding gums, and oral abnormalities and no teeth problem  Patient reports no sore throat, no difficulty swallowing between bracket dysphagia, no anterior neck pain/tenderness, no snoring no change in voice.  Patient reports no chest pain, no arm pain on exertion, no shortness of breath when walking, no shortness of breath when lying down, no palpitations, no known heart murmur, no lightheadedness, no calf or jaw pains, and no ankle edema.  Patient reports no cough, no nasal congestion, no runny nose, no sinus pressure, no wheezing, no frequent sneezing, no shortness of  breath, no rapid breathing, no sputum production and no coughing up blood  Patient reports no nausea, no vomiting, no vomiting blood, no abdominal pain, normal appetite, no diarrhea, no constipation, no dyspepsia/reflux, no heartburn, no early satiety, complete emptying of stool cup, no bloating, able to restrain bowel movements, no bowel urgency and no blood in stools/on toilet paper.  Patient reports no pain during urination, no incontinence, no difficulty urinating, no hematuria, no increased frequency, no feelings of urgency, no flank pains and no urinary tract infections  Patient reports no muscle aches, no muscle weakness, no muscle cramps, no arthralgias/joint pain, no back pains, no swelling in the extremities and no difficulty walking.  Patient reports in dependency.  Patient reports no loss of consciousness, no slurred speech, no weakness, no numbness, no tingling, no tremors, no seizures, no dizziness, no headaches, no memory lapses or changes, no difficulty finding desired words, no loss of balance or falls  Patient reports no irritability, no depression, no anxiety, no panic attacks, no sleep disturbances, feeling safe in her relationship, no paranoia and no thoughts about suicide  Patient reports no fatigue, no cold intolerance, no heat intolerance and no unusual body odor  Patient reports no bruising, no swollen glands, no clotting problems, and no bleeding disorders    Review for Opioid Screening: Pt does not have Rx for Opioids (If patient has Rx list here)      Review for Substance Use Disorders: Patient does not use substance (If patient has Rx list here)       Physical Exam:  Patient is a  year old   Constitutional:  General appearance:  Healthy appearing, well nourished, well developed, and well groomed.  Level of distress:  NAD.  Ambulation:  Ambulating normally.  Psychiatric:  Insight:  Good judgment.  Mental status:  Normal mood and affect an active and alert.  Orientation:  To time, place  and person.  Memory:  Recent memory  Normal and remote memory normal.  Head:  Normocephalic, atraumatic, symmetrical, no tenderness, and hair is evenly distributed.  Eyes:  Lids and conjunctivae:  No discharge, pallor or redness and noninjected.  Pupils:  Kept it PERRLA.  Corneas: Grossly intact and fluorescein stain normal.  Funduscopic examination:  Normal vessels and optic discs, no exudates or hemorrhages and grossly normal except where noted.  Capital E OM: Intact.  Lungs: Clear.  Sclera: Nonicteric.  Vision: Peripheral vision grossly intact in acuity grossly intact.  Optic disc:  Normal appearance and vessels and no exudates or hemorrhages  ENMT:  Ears:  No lesions on external ear, EACs clear.  TMs clear.  TM mobility normal and normal general appearance.  Hearing:  No hearing loss and Rinne: AC > BC.  Nose:  No polyps, lesions on external nose, septal deviation, sinus tenderness, or nasal discharge; nasal passages clear mucosa pink; and nares patent.  Lips teeth and gums: No mouth or lip ulcers or bleeding.  Gums are normal dentition normal.  Oropharynx:  No erythema exudates or ulcers and moist mucous membranes.  Tonsils not enlarged.  Oral mucosa and salivary glands normal  Neck:  Neck is supple, symmetrical, trachea midline and no masses.  Lymph nodes:  No cervical lymphadenopathy.  Thyroid no enlargement or nodules and nontender  Lungs:  Respiratory effort no dyspnea.  Percussion: No dullness, flatness or hyper-resonance.  Auscultation:  No wheezing, rales/crackles, no rhonchi and breath sounds normal, good air movement, and clear to auscultation except as noted.  Chest deformity: normal thoracic no deformities  Cardiovascular:  Apical pulse: Nondisplaced.  Heart auscultation:  Normal S1 and S2; no murmurs rubs or gallops; and RRR.  Neck vessels: No carotid bruit on the right or left and no JVDs or hepatojugular reflux.  Arterial pulses normal on the right and left radial femoral and dorsalis pedis and  posterior tibial.  Varicosities right and left non-existent and capillary refill test normal.  Edema none: on the right or left  Breast: deferred  Abdomen:  Bowel sounds normal.  Inspection and palpation:  No tenderness guarding masses rebound tenderness or CVA tenderness and soft and nondistended.  Liver column nontender and no hepatomegaly.  Spleen:  Nontender and no splenomegaly.  Hernia: Nonpalpable  Female :  Deferred  Rectal:  Deferred  Musculoskeletal:  Motor strength and tone:  Normal and normal tone.  Joints bones and muscles:  No contractures malalignment, tenderness or bony abnormalities and normal movement of all extremities and posture is normal.  Extremities:  No cyanosis clubbing or palpable cords  Neurological examination: Gait and station:  Normal gait and station.  Cranial nerves:  Grossly intact.  Sensation:  Monofilament test intact and normal and grossly intact.  Reflexes:  DTRs 2+ bilaterally throughout.  Coordination and cerebellum: no intention tremors, no resting tremors. Romberg's sign: negative. Motor strength normal on the right and left.  Sensation normal on the right and left side.  No pain/temperature decrease on the legs and dorsum of the feet.  No tactile decreased on the leg and dorsum of the feet.  No vibration- perception threshold decrease  Skin:  Inspection and palpation: No rash, lesions, ulcers, nodules, jaundice or abnormal nevi and good turgor.  Nails:  Normal.  Right and left lower extremities column normal  Back: Thoracolumbar appearance: Normal curvature  Foot examination:  Right and left feet were examined, and toes were examined:  No deformity, inter digital erythema, or nail changes or disorders and digital hair present and normal motion.     I offered to discuss end of life issues, including information on how to make advance directives that the patient could use to name someone who would make medical decisions on their behalf if they became too ill to make  themselves.      __Patient declined       _X__Patient is interested, I provided paperwork and offered to discuss     1. Chronic kidney disease (CKD) stage G3a/A1, moderately decreased glomerular filtration rate (GFR) between 45-59 mL/min/1.73 square meter and albuminuria creatinine ratio less than 30 mg/g  Overview:  With stage III A chronic kidney disease  Improved    Assessment & Plan:  I discussed renal condition with patient at length  We discussed diet modification specifically decreasing salt intake, avoiding fast food ,avoiding fried food  We also discussed the importance of minimizing the use of anti-inflammatory agents like Motrin ibuprofen naproxen Aleve etc.  We discussed the importance of increasing fluid intake specifically water and also stay hydrated  We discussed minimizing the use of diuretics as much as clinically possible  We are going to monitor renal functions BUN creatinine urine microalbumin and electrolytes closely  To consider referral to Nephrology Service if renal fx continue to decline  To consider adding an SGL- 2 inhibitor if renal functions continue to decline, regardless of the blood sugar status  Dec demadex to 1/2 tab q.o.d.    Orders:  -     Microalbumin/Creatinine Ratio, Urine; Future; Expected date: 09/13/2023  -     Comprehensive Metabolic Panel; Future; Expected date: 09/13/2023    2. Essential hypertension, benign  -     Microalbumin/Creatinine Ratio, Urine; Future; Expected date: 09/13/2023  -     Comprehensive Metabolic Panel; Future; Expected date: 09/13/2023    3. Encounter for annual wellness visit (AWV) in Medicare patient  Overview:  AWV today    Assessment & Plan:  We discussed shingrix, flu vaccines  She has an eye doc sched this month. Get report      4. Type 2 diabetes mellitus with stage 3a chronic kidney disease, without long-term current use of insulin  Overview:  A1c improving  Renal fx declined    Assessment & Plan:  We discussed diet modif , healthy eating &  exercise  T/c ref to our diab educ    Orders:  -     Ambulatory referral/consult to Diabetes Education; Future; Expected date: 09/20/2023    Other orders  -     potassium chloride SA (K-DUR,KLOR-CON) 20 MEQ tablet; Take 1 tablet (20 mEq total) by mouth once daily.  Dispense: 90 tablet; Refill: 3  -     metFORMIN (GLUCOPHAGE-XR) 500 MG ER 24hr tablet; Take 1 tablet (500 mg total) by mouth every evening.  Dispense: 90 tablet; Refill: 3  -     metoprolol succinate (TOPROL-XL) 100 MG 24 hr tablet; Take 1 tablet (100 mg total) by mouth once daily.  Dispense: 90 tablet; Refill: 3  -     levothyroxine (SYNTHROID) 75 MCG tablet; Take 1 tablet (75 mcg total) by mouth before breakfast.  Dispense: 90 tablet; Refill: 3

## 2023-09-21 ENCOUNTER — TELEPHONE (OUTPATIENT)
Dept: OBSTETRICS AND GYNECOLOGY | Facility: CLINIC | Age: 73
End: 2023-09-21

## 2023-09-21 ENCOUNTER — OFFICE VISIT (OUTPATIENT)
Dept: OBSTETRICS AND GYNECOLOGY | Facility: CLINIC | Age: 73
End: 2023-09-21
Payer: MEDICARE

## 2023-09-21 VITALS
HEIGHT: 61 IN | BODY MASS INDEX: 35.3 KG/M2 | DIASTOLIC BLOOD PRESSURE: 84 MMHG | SYSTOLIC BLOOD PRESSURE: 124 MMHG | WEIGHT: 187 LBS

## 2023-09-21 DIAGNOSIS — Z12.4 PAP SMEAR FOR CERVICAL CANCER SCREENING: ICD-10-CM

## 2023-09-21 DIAGNOSIS — Z12.31 BREAST CANCER SCREENING BY MAMMOGRAM: ICD-10-CM

## 2023-09-21 DIAGNOSIS — Z01.419 ENCOUNTER FOR ANNUAL ROUTINE GYNECOLOGICAL EXAMINATION: Primary | ICD-10-CM

## 2023-09-21 PROCEDURE — G0101 PR CA SCREEN;PELVIC/BREAST EXAM: ICD-10-PCS | Mod: GZ,S$GLB,, | Performed by: OBSTETRICS & GYNECOLOGY

## 2023-09-21 PROCEDURE — G0101 CA SCREEN;PELVIC/BREAST EXAM: HCPCS | Mod: GZ,S$GLB,, | Performed by: OBSTETRICS & GYNECOLOGY

## 2023-09-21 PROCEDURE — 88175 CYTOPATH C/V AUTO FLUID REDO: CPT | Performed by: OBSTETRICS & GYNECOLOGY

## 2023-09-21 NOTE — PROGRESS NOTES
Medicare Breast and Pelvic    HPI:  Linda Gannon Mucha is a 73 y.o. female  presents for a well woman exam.  LMP: No LMP recorded. Patient is postmenopausal..  No issues, problems, or complaints.    Past Medical History:   Diagnosis Date    Chronic kidney disease (CKD) stage G3a/A1, moderately decreased glomerular filtration rate (GFR) between 45-59 mL/min/1.73 square meter and albuminuria creatinine ratio less than 30 mg/g 2023    With stage III A chronic kidney disease Improved    Diabetes mellitus, type 2     Disorder of kidney and ureter     Hypertension     Inadequately controlled diabetes mellitus 01/10/2023    In general , A1c is under good control but she suffers from diabetic nephropathy GFR improved I discussed renal condition with patient at length We discussed diet modification specifically decreasing salt intake, avoiding fast food ,avoiding fried food We also discussed the importance of minimizing the use of anti-inflammatory agents like Motrin ibuprofen naproxen Aleve etc. We discussed the impor    Severe obesity (BMI 35.0-39.9) with comorbidity 2023    Thyroid disease      Past Surgical History:   Procedure Laterality Date    CHOLECYSTECTOMY       Social History     Socioeconomic History    Marital status:     Number of children: 2   Tobacco Use    Smoking status: Former     Current packs/day: 0.00     Types: Cigarettes     Passive exposure: Never    Smokeless tobacco: Former   Substance and Sexual Activity    Alcohol use: Not Currently     Comment: occasionally    Drug use: Not Currently    Sexual activity: Not Currently     Partners: Male     Birth control/protection: Abstinence     Social Determinants of Health     Physical Activity: Inactive (2023)    Exercise Vital Sign     Days of Exercise per Week: 0 days     Minutes of Exercise per Session: 0 min     Family History   Problem Relation Age of Onset    Heart disease Father     Heart disease Mother     Heart disease  "Brother     Breast cancer Sister     Uterine cancer Sister     Colon cancer Neg Hx     Ovarian cancer Neg Hx      OB History          2    Para   2    Term   2            AB        Living             SAB        IAB        Ectopic        Multiple        Live Births                     /84 (BP Location: Right arm, Patient Position: Sitting)   Ht 5' 1" (1.549 m)   Wt 84.8 kg (187 lb)   BMI 35.33 kg/m²     ROS:  GENERAL: Denies weight gain or weight loss. Feeling well overall.   SKIN: Denies rash or lesions.   HEAD: Denies head injury or headache.   NODES: Denies enlarged lymph nodes.   CHEST: Denies chest pain or shortness of breath.   CARDIOVASCULAR: Denies palpitations or left sided chest pain.   ABDOMEN: No abdominal pain, constipation, diarrhea, nausea, vomiting or rectal bleeding.   URINARY: No frequency, dysuria, hematuria, or burning on urination.  REPRODUCTIVE: See HPI.   BREASTS: The patient performs breast self-examination and denies pain, lumps, or nipple discharge.   HEMATOLOGIC: No easy bruisability or excessive bleeding.   MUSCULOSKELETAL: Denies joint pain or swelling.   NEUROLOGIC: Denies syncope or weakness.   PSYCHIATRIC: Denies depression, anxiety or mood swings.    PHYSICAL EXAM:    APPEARANCE: Well nourished, well developed, in no acute distress.  AFFECT: WNL, alert and oriented x 3  SKIN: No acne or hirsutism  NECK: Neck symmetric without masses or thyromegaly  NODES: No inguinal, cervical, axillary, or femoral lymph node enlargement  CHEST: Good respiratory effect  ABDOMEN: Soft.  No tenderness or masses.  No hepatosplenomegaly.  No hernias.  BREASTS: Symmetrical, no skin changes or visible lesions.  No palpable masses, nipple discharge bilaterally.  PELVIC: Normal external genitalia without lesions.  Normal hair distribution.  Adequate perineal body, normal urethral meatus.  Vagina moist and well rugated without lesions or discharge.  Cervix pink, without lesions, " discharge or tenderness.  No significant cystocele or rectocele.  Bimanual exam shows uterus to be normal size, regular, mobile and nontender.  Adnexa without masses or tenderness.    RECTAL: Rectovaginal exam confirms above with normal sphincter tone, no masses.  EXTREMITIES: No edema.      ICD-10-CM ICD-9-CM    1. Encounter for annual routine gynecological examination  Z01.419 V72.31       2. Pap smear for cervical cancer screening  Z12.4 V76.2 Liquid-Based Pap Smear, Screening      Liquid-Based Pap Smear, Screening      3. Breast cancer screening by mammogram  Z12.31 V76.12 Mammo Digital Screening Bilat w/ Amanuel      Mammo Digital Screening Bilat w/ Amanuel        Mammogram ordered  Pap done  Has done Cologuard for colon cancer screening  Significant family history of breast and uterus cancer in 2 sisters, genetic studies ordered

## 2023-09-21 NOTE — TELEPHONE ENCOUNTER
----- Message from Aditi Robledo sent at 9/21/2023 11:08 AM CDT -----  Contact: PT  Type:  Patient Returning Call    Who Called:  PT  Who Left Message for Patient:  Cara  Does the patient know what this is regarding?:  yes  Best Call Back Number:  [phone]  Additional Information:   PT called stated she changed her mind regarding the test decided not to do it. If  changes mind will give a call back

## 2023-09-28 LAB
FINAL PATHOLOGIC DIAGNOSIS: NORMAL
Lab: NORMAL

## 2023-10-12 ENCOUNTER — PATIENT OUTREACH (OUTPATIENT)
Dept: ADMINISTRATIVE | Facility: HOSPITAL | Age: 73
End: 2023-10-12
Payer: MEDICARE

## 2023-10-12 NOTE — LETTER
FAX      AUTHORIZATION FOR RELEASE OF   CONFIDENTIAL INFORMATION      PRECISION VISION       We are seeing Linda Gannon Mucha, date of birth 1950, in the clinic at [facility]. Steve Manrique MD is the patient's PCP. Linda Gannon Mucha has an outstanding lab/procedure at the time we reviewed their chart. In order to help keep their health information updated, Linda Gannon Mucha has authorized us to request the following medical records:      DIABETIC EYE EXAM - WITH RETINAL SCREENING (MOST RECENT WITHIN 48 MONTHS)      Please fax records to Steve Manrique MD at [facility] [phone].    If you have any questions, please contact Jacinta at [phone].      Jacinta Griffin LPN  Performance Improvement Coordinator for Population Health  [facility]    Patient Name: Linda Gannon Mucha  : 1950  Patient Phone #: [phone]

## 2023-10-12 NOTE — PROGRESS NOTES
Population Health Chart Review & Patient Outreach Details:     Reason for Outreach Encounter:     [x]  Non-Compliant Report   []  Payor Report ([employer], PHN, BCBS, MSSP, MCIP, UHC, etc.)   []  Pre-Visit Chart Review     Updates Requested / Reviewed:     []  Care Everywhere    []     []  External Sources ([facility], [facility], DIS, etc.)   [x]  Care Team Updated    Patient Outreach Method:    []  Telephone Outreach Completed   [] Successful   [] Left Voicemail   [] Unable to Contact (wrong number, no voicemail)  []  [email or web address] [facility] Outreach Sent  []  Letter Outreach Mailed  [x]  Fax Sent for External Records  []  External Records Upload    Health Maintenance Topics Addressed and Outreach Outcomes / Actions Taken:        []      Breast Cancer Screening []  Mammo Scheduled      []  External Records Requested     []  Added Reminder to Complete to Upcoming Primary Care Appt Notes     []  Patient Declined     []  Patient Will Call Back to Schedule     []  Patient Will Schedule with External Provider / Order Routed if Applicable             []       Cervical Cancer Screening []  Pap Scheduled      []  External Records Requested     []  Added Reminder to Complete to Upcoming Primary Care Appt Notes     []  Patient Declined     []  Patient Will Call Back to Schedule     []  Patient Will Schedule with External Provider               []          Colorectal Cancer Screening []  Colonoscopy Case Request or Referral Placed     []  External Records Requested     []  Added Reminder to Complete to Upcoming Primary Care Appt Notes     []  Patient Declined     []  Patient Will Call Back to Schedule     []  Patient Will Schedule with External Provider     []  Fit Kit Mailed (add the SmartPhrase under additional notes)     []  Reminded Patient to Complete Home Test             [x]      Diabetic Eye Exam []  Eye Camera Scheduled or Optometry Referral Placed     [x]  External Records Requested     []  Added Reminder to Complete to  Upcoming Primary Care Appt Notes     []  Patient Declined     []  Patient Will Call Back to Schedule     []  Patient Will Schedule with External Provider             []      Blood Pressure Control []  Primary Care Follow Up Visit Scheduled     []  Remote Blood Pressure Reading Captured     []  Added Reminder to Complete to Upcoming Primary Care Appt Notes     []  Patient Declined     []  Patient Will Call Back / Patient Will Send Portal Message with Reading     []  Patient Will Call Back to Schedule Provider Visit             []       HbA1c & Other Labs []  Lab Appt Scheduled for Due Labs     []  Primary Care Follow Up Visit Scheduled      []  Reminded Patient to Complete Home Test     []  Added Reminder to Complete to Upcoming Primary Care Appt Notes     []  Patient Declined     []  Patient Will Call Back to Schedule     []  Patient Will Schedule with External Provider / Order Routed if Applicable           []    Schedule Primary Care Appt []  Primary Care Appt Scheduled     []  Patient Declined     []  Patient Will Call Back to Schedule     []  Pt Established with External Provider & Updated Care Team             []      Medication Adherence []  Primary Care Appointment Scheduled     []  Added Reminder to Upcoming Primary Care Appt Notes     []  Patient Reminded to  Prescription     []  Patient Declined, Provider Notified if Needed     []  Sent Provider Message to Review and/or Add Exclusion to Problem List             []      Osteoporosis Screening []  DXA Appointment Scheduled     []  External Records Requested     []  Added Reminder to Complete to Upcoming Primary Care Appt Notes     []  Patient Declined     []  Patient Will Call Back to Schedule     []  Patient Will Schedule with External Provider / Order Routed if Applicable     Additional Care Coordinator Notes:     10/12/2023  EFAX SENT TO [facility] FOR MOST RECENT DM EYE EXAM RESULTS    Further Action Needed If Patient Returns Outreach:

## 2023-10-13 ENCOUNTER — PATIENT OUTREACH (OUTPATIENT)
Dept: ADMINISTRATIVE | Facility: HOSPITAL | Age: 73
End: 2023-10-13
Payer: MEDICARE

## 2023-10-13 RX ORDER — LEVOTHYROXINE SODIUM 75 UG/1
75 TABLET ORAL
Qty: 90 TABLET | Refills: 3 | Status: SHIPPED | OUTPATIENT
Start: 2023-10-13 | End: 2024-02-16 | Stop reason: SDUPTHER

## 2023-10-13 NOTE — PROGRESS NOTES
Population Health Chart Review & Patient Outreach Details:     Additional Care Coordinator Notes:      Reason for Outreach Encounter:     []  Non-Compliant Report   []  Payor Report ([employer], PHN, BCBS, MSSP, MCIP, UHC, etc.)   [x]  Chart Review   []  Weekly Bulk Order Report- Order placed               []  Epic Campaign     Updates Requested / Reviewed:     []  Care Everywhere    []     []  External Sources ([facility], [employer]),               []  External Source DIS              []  External Source Magazine   [x]  Care Team Updated    Patient Outreach Method:      []  Telephone Outreach Completed   [] Successful   [] Left Voicemail   [] Unable to Contact (wrong number, no voicemail)  []  [facility] Outreach Sent  []  Letter Outreach Mailed        Health Maintenance Topics Addressed and Outreach Outcomes / Actions Taken:        []      Breast Cancer Screening   []  Mammo Scheduled      []  External Records Requested     []  Patient Declined     []  Patient Will Call Back to Schedule     []  Patient Will Schedule with External Provider / Order Routed if Applicable     [] MAMMOGRAM ORDERED     [] External Records Uploaded             []       Cervical Cancer Screening []  Pap Scheduled      []  External Records Requested     []  Patient Declined     []  Patient Will Call Back to Schedule     []  Patient Will Schedule with External Provider     [] External Records Uploaded               []          Colorectal Cancer Screening [] Colonoscopy Case Request or Referral Placed     []  External Records Requested     []  Patient Declined     []  Patient Will Call Back to Schedule     []  Patient Will Schedule with External Provider     []  Fit Kit Mailed (add the SmartPhrase under additional notes)     []  Reminded Patient to Complete Home Test     [] FIT KIT ORDERED     [] External Records Uploaded             [x]      Diabetic Eye Exam []  Eye Camera Scheduled or Optometry Referral Placed     []  External  Records Requested     []  Patient Declined     []  Patient Will Call Back to Schedule     []  Patient Will Schedule with External Provider     [x] External Records Uploaded             []      Blood Pressure Control []  Primary Care Follow Up Visit Scheduled     []  Remote Blood Pressure Reading Captured     []  Patient Declined     []  Patient Will Call Back / Patient Will Send Portal Message with Reading     []  Patient Will Call Back to Schedule Provider Visit             []       HbA1c & Other Labs []  Lab Appt Scheduled for Due Labs     []  Primary Care Follow Up Visit Scheduled      []  Reminded Patient to Complete Home Test     []  Patient Declined     []  Patient Will Call Back to Schedule     []  Patient Will Schedule with External Provider / Order Routed if Applicable     [] LABS ORDERED     [] External Records Uploaded           []    Schedule Primary Care Appt []  Primary Care Appt Scheduled     []  Patient Declined     []  Patient Will Call Back to Schedule     []  Pt Established with External Provider & Updated Care Team             []      Medication Adherence []  Primary Care Appointment Scheduled     []  Reminder Added to Upcoming Primary Care Appt Notes     []  Patient Reminded to  Prescription     []  Patient Declined, Provider Notified if Needed     []  Sent Provider Message to Review and/or Add Exclusion to Problem List             []      Osteoporosis Screening []  DXA Appointment Scheduled     []  External Records Requested     []  Patient Declined     []  Patient Will Call Back to Schedule     []  Patient Will Schedule with External Provider / Order Routed if Applicable     [] DEXA ORDERED     [] External Records Uploaded

## 2023-11-08 LAB
LEFT EYE DM RETINOPATHY: NEGATIVE
RIGHT EYE DM RETINOPATHY: NEGATIVE

## 2023-11-14 ENCOUNTER — HOSPITAL ENCOUNTER (OUTPATIENT)
Dept: RADIOLOGY | Facility: HOSPITAL | Age: 73
Discharge: HOME | End: 2023-11-14
Attending: OBSTETRICS & GYNECOLOGY
Payer: MEDICARE

## 2023-11-14 PROCEDURE — 77067 MAMMO DIGITAL SCREENING BILAT WITH TOMO: ICD-10-PCS | Mod: 26,,, | Performed by: RADIOLOGY

## 2023-11-14 PROCEDURE — 77063 MAMMO DIGITAL SCREENING BILAT WITH TOMO: ICD-10-PCS | Mod: 26,,, | Performed by: RADIOLOGY

## 2023-11-14 PROCEDURE — 77067 SCR MAMMO BI INCL CAD: CPT | Mod: TC

## 2023-11-14 PROCEDURE — 77063 BREAST TOMOSYNTHESIS BI: CPT | Mod: 26,,, | Performed by: RADIOLOGY

## 2023-11-14 PROCEDURE — 77067 SCR MAMMO BI INCL CAD: CPT | Mod: 26,,, | Performed by: RADIOLOGY

## 2023-12-13 ENCOUNTER — OFFICE VISIT (OUTPATIENT)
Dept: FAMILY MEDICINE | Facility: CLINIC | Age: 73
End: 2023-12-13
Payer: MEDICARE

## 2023-12-13 ENCOUNTER — LAB VISIT (OUTPATIENT)
Dept: LAB | Facility: CLINIC | Age: 73
End: 2023-12-13
Payer: MEDICARE

## 2023-12-13 VITALS
DIASTOLIC BLOOD PRESSURE: 72 MMHG | OXYGEN SATURATION: 97 % | HEIGHT: 61 IN | BODY MASS INDEX: 35.15 KG/M2 | TEMPERATURE: 98 F | HEART RATE: 70 BPM | SYSTOLIC BLOOD PRESSURE: 115 MMHG | WEIGHT: 186.19 LBS

## 2023-12-13 DIAGNOSIS — N18.31 CHRONIC KIDNEY DISEASE (CKD) STAGE G3A/A1, MODERATELY DECREASED GLOMERULAR FILTRATION RATE (GFR) BETWEEN 45-59 ML/MIN/1.73 SQUARE METER AND ALBUMINURIA CREATININE RATIO LESS THAN 30 MG/G: ICD-10-CM

## 2023-12-13 DIAGNOSIS — E11.9 DIABETES MELLITUS WITHOUT COMPLICATION: ICD-10-CM

## 2023-12-13 DIAGNOSIS — N18.31 STAGE 3A CHRONIC KIDNEY DISEASE: ICD-10-CM

## 2023-12-13 DIAGNOSIS — E11.65 INADEQUATELY CONTROLLED DIABETES MELLITUS: Primary | ICD-10-CM

## 2023-12-13 DIAGNOSIS — A49.01 STAPH AUREUS INFECTION: ICD-10-CM

## 2023-12-13 LAB
ANION GAP SERPL CALC-SCNC: 11 MMOL/L (ref 8–16)
BUN SERPL-MCNC: 26 MG/DL (ref 8–23)
CALCIUM SERPL-MCNC: 10 MG/DL (ref 8.7–10.5)
CHLORIDE SERPL-SCNC: 103 MMOL/L (ref 95–110)
CO2 SERPL-SCNC: 22 MMOL/L (ref 23–29)
CREAT SERPL-MCNC: 1.1 MG/DL (ref 0.5–1.4)
EST. GFR  (NO RACE VARIABLE): 52.4 ML/MIN/1.73 M^2
GLUCOSE SERPL-MCNC: 101 MG/DL (ref 70–110)
POTASSIUM SERPL-SCNC: 4.3 MMOL/L (ref 3.5–5.1)
SODIUM SERPL-SCNC: 136 MMOL/L (ref 136–145)

## 2023-12-13 PROCEDURE — 80048 BASIC METABOLIC PNL TOTAL CA: CPT | Performed by: INTERNAL MEDICINE

## 2023-12-13 PROCEDURE — 99214 OFFICE O/P EST MOD 30 MIN: CPT | Mod: S$GLB,,, | Performed by: INTERNAL MEDICINE

## 2023-12-13 PROCEDURE — 36415 COLL VENOUS BLD VENIPUNCTURE: CPT | Mod: ,,, | Performed by: STUDENT IN AN ORGANIZED HEALTH CARE EDUCATION/TRAINING PROGRAM

## 2023-12-13 PROCEDURE — 36415 PR COLLECTION VENOUS BLOOD,VENIPUNCTURE: ICD-10-PCS | Mod: ,,, | Performed by: STUDENT IN AN ORGANIZED HEALTH CARE EDUCATION/TRAINING PROGRAM

## 2023-12-13 PROCEDURE — 99214 PR OFFICE/OUTPT VISIT, EST, LEVL IV, 30-39 MIN: ICD-10-PCS | Mod: S$GLB,,, | Performed by: INTERNAL MEDICINE

## 2023-12-13 RX ORDER — MUPIROCIN 20 MG/G
OINTMENT TOPICAL DAILY
Qty: 30 G | Refills: 3 | Status: SHIPPED | OUTPATIENT
Start: 2023-12-13 | End: 2024-12-12

## 2023-12-13 RX ORDER — TIRZEPATIDE 2.5 MG/.5ML
2.5 INJECTION, SOLUTION SUBCUTANEOUS
Qty: 12 PEN | Refills: 0 | Status: SHIPPED | OUTPATIENT
Start: 2023-12-13 | End: 2024-03-06

## 2023-12-13 NOTE — PROGRESS NOTES
Subjective:       Patient ID: Linda Gannon Mucha is a 73 y.o. female.    Chief Complaint: Follow-up (No labs done since last visit. Overall no complaints,.) and Chronic Kidney Disease      Patient is established already .......... presents today for a f/u visit , to discuss getting new labs results and for management of the chronic conditions    Follow-up  Pertinent negatives include no fever.   Diabetes  She presents for her follow-up diabetic visit. She has type 2 diabetes mellitus. No MedicAlert identification noted. The initial diagnosis of diabetes was made 15 years ago. Her disease course has been improving. There are no hypoglycemic associated symptoms. There are no diabetic associated symptoms. There are no hypoglycemic complications. There are no diabetic complications. Risk factors for coronary artery disease include dyslipidemia, diabetes mellitus, family history, hypertension, obesity, sedentary lifestyle and post-menopausal. Current diabetic treatment includes oral agent (dual therapy). She is compliant with treatment most of the time. She is following a generally healthy diet. Meal planning includes avoidance of concentrated sweets. She has not had a previous visit with a dietitian. She participates in exercise intermittently. There is no change in her home blood glucose trend. An ACE inhibitor/angiotensin II receptor blocker is being taken. She does not see a podiatrist.Eye exam is current.     Review of Systems   Constitutional:  Negative for activity change, fever and unexpected weight change.   Respiratory: Negative.     Cardiovascular: Negative.    Neurological: Negative.          Objective:      Physical Exam  Vitals and nursing note reviewed.   Constitutional:       Appearance: Normal appearance.   Cardiovascular:      Rate and Rhythm: Normal rate and regular rhythm.   Pulmonary:      Effort: Pulmonary effort is normal.      Breath sounds: Normal breath sounds.   Musculoskeletal:      Cervical  back: Normal range of motion and neck supple.   Neurological:      Mental Status: She is alert.         Assessment:       1. Inadequately controlled diabetes mellitus  -     tirzepatide (MOUNJARO) 2.5 mg/0.5 mL PnIj; Inject 2.5 mg into the skin every 7 days.  Dispense: 12 pen ; Refill: 0    2. Chronic kidney disease (CKD) stage G3a/A1, moderately decreased glomerular filtration rate (GFR) between 45-59 mL/min/1.73 square meter and albuminuria creatinine ratio less than 30 mg/g  Overview:  With stage III A chronic kidney disease  Improved    Assessment & Plan:  On jardiance  Recheck BMP today    Orders:  -     Basic Metabolic Panel; Future; Expected date: 12/13/2023    3. Stage 3a chronic kidney disease  Overview:  With stage III A chronic kidney disease  Improved    Assessment & Plan:  On jardiance  Recheck BMP today      4. Diabetes mellitus without complication  Overview:  In general , A1c is under good control but she suffers from diabetic nephropathy  GFR improved  I discussed renal condition with patient at length  We discussed diet modification specifically decreasing salt intake, avoiding fast food ,avoiding fried food  We also discussed the importance of minimizing the use of anti-inflammatory agents like Motrin ibuprofen naproxen Aleve etc.  We discussed the importance of increasing fluid intake specifically water and also stay hydrated  We discussed minimizing the use of diuretics as much as clinically possible  We are going to monitor renal functions BUN creatinine urine microalbumin and electrolytes closely  To consider referral to Nephrology Service if renal fx continue to decline  To consider adding an SGL- 2 inhibitor if renal functions continue to decline, regardless of the blood sugar status      Assessment & Plan:  She's off trulicity b/o inefftiveness  Change to Mounjaro  Recheck A1c in 3M      5. Staph aureus infection  Overview:  By history    Assessment & Plan:  Refill mupirocin nasal  oint      Other orders  -     mupirocin (BACTROBAN) 2 % ointment; by Nasal route once daily.  Dispense: 30 g; Refill: 3           Plan:       1. Inadequately controlled diabetes mellitus  -     tirzepatide (MOUNJARO) 2.5 mg/0.5 mL PnIj; Inject 2.5 mg into the skin every 7 days.  Dispense: 12 pen ; Refill: 0    2. Chronic kidney disease (CKD) stage G3a/A1, moderately decreased glomerular filtration rate (GFR) between 45-59 mL/min/1.73 square meter and albuminuria creatinine ratio less than 30 mg/g  Overview:  With stage III A chronic kidney disease  Improved    Assessment & Plan:  On jardiance  Recheck BMP today    Orders:  -     Basic Metabolic Panel; Future; Expected date: 12/13/2023    3. Stage 3a chronic kidney disease  Overview:  With stage III A chronic kidney disease  Improved    Assessment & Plan:  On jardiance  Recheck BMP today      4. Diabetes mellitus without complication  Overview:  In general , A1c is under good control but she suffers from diabetic nephropathy  GFR improved  I discussed renal condition with patient at length  We discussed diet modification specifically decreasing salt intake, avoiding fast food ,avoiding fried food  We also discussed the importance of minimizing the use of anti-inflammatory agents like Motrin ibuprofen naproxen Aleve etc.  We discussed the importance of increasing fluid intake specifically water and also stay hydrated  We discussed minimizing the use of diuretics as much as clinically possible  We are going to monitor renal functions BUN creatinine urine microalbumin and electrolytes closely  To consider referral to Nephrology Service if renal fx continue to decline  To consider adding an SGL- 2 inhibitor if renal functions continue to decline, regardless of the blood sugar status      Assessment & Plan:  She's off trulicity b/o inefftiveness  Change to Mounjaro  Recheck A1c in 3M      5. Staph aureus infection  Overview:  By history    Assessment & Plan:  Refill  mupirocin nasal oint      Other orders  -     mupirocin (BACTROBAN) 2 % ointment; by Nasal route once daily.  Dispense: 30 g; Refill: 3

## 2023-12-18 PROBLEM — Z00.00 ENCOUNTER FOR ANNUAL WELLNESS VISIT (AWV) IN MEDICARE PATIENT: Status: RESOLVED | Noted: 2023-05-24 | Resolved: 2023-12-18

## 2023-12-27 RX ORDER — EMPAGLIFLOZIN 10 MG/1
10 TABLET, FILM COATED ORAL
Qty: 90 TABLET | Refills: 3 | Status: SHIPPED | OUTPATIENT
Start: 2023-12-27

## 2024-02-14 RX ORDER — PRAVASTATIN SODIUM 40 MG/1
40 TABLET ORAL NIGHTLY
Qty: 90 TABLET | Refills: 3 | Status: SHIPPED | OUTPATIENT
Start: 2024-02-14 | End: 2025-02-14

## 2024-02-16 ENCOUNTER — PATIENT MESSAGE (OUTPATIENT)
Dept: FAMILY MEDICINE | Facility: CLINIC | Age: 74
End: 2024-02-16
Payer: MEDICARE

## 2024-02-16 RX ORDER — LEVOTHYROXINE SODIUM 75 UG/1
75 TABLET ORAL
Qty: 90 TABLET | Refills: 3 | Status: SHIPPED | OUTPATIENT
Start: 2024-02-16 | End: 2024-02-16 | Stop reason: SDUPTHER

## 2024-02-16 RX ORDER — LEVOTHYROXINE SODIUM 75 UG/1
75 TABLET ORAL
Qty: 15 TABLET | Refills: 0 | Status: SHIPPED | OUTPATIENT
Start: 2024-02-16 | End: 2024-03-15 | Stop reason: SDUPTHER

## 2024-03-13 ENCOUNTER — LAB VISIT (OUTPATIENT)
Dept: LAB | Facility: CLINIC | Age: 74
End: 2024-03-13
Payer: MEDICARE

## 2024-03-13 DIAGNOSIS — E11.9 DIABETES MELLITUS WITHOUT COMPLICATION: ICD-10-CM

## 2024-03-13 DIAGNOSIS — N18.31 CHRONIC KIDNEY DISEASE (CKD) STAGE G3A/A1, MODERATELY DECREASED GLOMERULAR FILTRATION RATE (GFR) BETWEEN 45-59 ML/MIN/1.73 SQUARE METER AND ALBUMINURIA CREATININE RATIO LESS THAN 30 MG/G: ICD-10-CM

## 2024-03-13 DIAGNOSIS — I10 ESSENTIAL HYPERTENSION, BENIGN: ICD-10-CM

## 2024-03-13 LAB
ALBUMIN SERPL BCP-MCNC: 4.1 G/DL (ref 3.5–5.2)
ALBUMIN/CREAT UR: 41.8 UG/MG (ref 0–30)
ALP SERPL-CCNC: 118 U/L (ref 55–135)
ALT SERPL W/O P-5'-P-CCNC: 22 U/L (ref 10–44)
ANION GAP SERPL CALC-SCNC: 12 MMOL/L (ref 8–16)
AST SERPL-CCNC: 17 U/L (ref 10–40)
BILIRUB SERPL-MCNC: 1.2 MG/DL (ref 0.1–1)
BUN SERPL-MCNC: 28 MG/DL (ref 8–23)
CALCIUM SERPL-MCNC: 9.4 MG/DL (ref 8.7–10.5)
CHLORIDE SERPL-SCNC: 106 MMOL/L (ref 95–110)
CO2 SERPL-SCNC: 20 MMOL/L (ref 23–29)
CREAT SERPL-MCNC: 1.1 MG/DL (ref 0.5–1.4)
CREAT UR-MCNC: 91 MG/DL (ref 15–325)
EST. GFR  (NO RACE VARIABLE): 52.4 ML/MIN/1.73 M^2
ESTIMATED AVG GLUCOSE: 128 MG/DL (ref 68–131)
GLUCOSE SERPL-MCNC: 126 MG/DL (ref 70–110)
HBA1C MFR BLD: 6.1 % (ref 4–5.6)
MICROALBUMIN UR DL<=1MG/L-MCNC: 38 UG/ML
POTASSIUM SERPL-SCNC: 4.2 MMOL/L (ref 3.5–5.1)
PROT SERPL-MCNC: 7.6 G/DL (ref 6–8.4)
SODIUM SERPL-SCNC: 138 MMOL/L (ref 136–145)

## 2024-03-13 PROCEDURE — 82043 UR ALBUMIN QUANTITATIVE: CPT | Performed by: INTERNAL MEDICINE

## 2024-03-13 PROCEDURE — 80053 COMPREHEN METABOLIC PANEL: CPT | Performed by: INTERNAL MEDICINE

## 2024-03-13 PROCEDURE — 83036 HEMOGLOBIN GLYCOSYLATED A1C: CPT | Performed by: INTERNAL MEDICINE

## 2024-03-13 PROCEDURE — 36415 COLL VENOUS BLD VENIPUNCTURE: CPT | Mod: ,,, | Performed by: INTERNAL MEDICINE

## 2024-03-15 ENCOUNTER — OFFICE VISIT (OUTPATIENT)
Dept: FAMILY MEDICINE | Facility: CLINIC | Age: 74
End: 2024-03-15
Payer: MEDICARE

## 2024-03-15 VITALS — DIASTOLIC BLOOD PRESSURE: 67 MMHG | SYSTOLIC BLOOD PRESSURE: 139 MMHG

## 2024-03-15 DIAGNOSIS — Z23 NEED FOR PROPHYLACTIC VACCINATION AND INOCULATION AGAINST INFLUENZA: ICD-10-CM

## 2024-03-15 DIAGNOSIS — E66.01 SEVERE OBESITY (BMI 35.0-39.9) WITH COMORBIDITY: ICD-10-CM

## 2024-03-15 DIAGNOSIS — E11.65 INADEQUATELY CONTROLLED DIABETES MELLITUS: Primary | ICD-10-CM

## 2024-03-15 DIAGNOSIS — N18.31 CHRONIC KIDNEY DISEASE (CKD) STAGE G3A/A1, MODERATELY DECREASED GLOMERULAR FILTRATION RATE (GFR) BETWEEN 45-59 ML/MIN/1.73 SQUARE METER AND ALBUMINURIA CREATININE RATIO LESS THAN 30 MG/G: ICD-10-CM

## 2024-03-15 DIAGNOSIS — Z00.00 PREVENTATIVE HEALTH CARE: ICD-10-CM

## 2024-03-15 DIAGNOSIS — E11.65 INADEQUATELY CONTROLLED DIABETES MELLITUS: ICD-10-CM

## 2024-03-15 DIAGNOSIS — E03.9 PRIMARY HYPOTHYROIDISM: Primary | ICD-10-CM

## 2024-03-15 DIAGNOSIS — N18.31 TYPE 2 DIABETES MELLITUS WITH STAGE 3A CHRONIC KIDNEY DISEASE, WITHOUT LONG-TERM CURRENT USE OF INSULIN: ICD-10-CM

## 2024-03-15 DIAGNOSIS — E11.22 TYPE 2 DIABETES MELLITUS WITH STAGE 3A CHRONIC KIDNEY DISEASE, WITHOUT LONG-TERM CURRENT USE OF INSULIN: ICD-10-CM

## 2024-03-15 DIAGNOSIS — R17 HIGH BILIRUBIN: ICD-10-CM

## 2024-03-15 DIAGNOSIS — N18.31 STAGE 3A CHRONIC KIDNEY DISEASE: ICD-10-CM

## 2024-03-15 PROCEDURE — 90694 VACC AIIV4 NO PRSRV 0.5ML IM: CPT | Mod: S$GLB,,, | Performed by: INTERNAL MEDICINE

## 2024-03-15 PROCEDURE — G0008 ADMIN INFLUENZA VIRUS VAC: HCPCS | Mod: S$GLB,,, | Performed by: INTERNAL MEDICINE

## 2024-03-15 PROCEDURE — 99214 OFFICE O/P EST MOD 30 MIN: CPT | Mod: S$GLB,,, | Performed by: INTERNAL MEDICINE

## 2024-03-15 RX ORDER — DULAGLUTIDE 1.5 MG/.5ML
1.5 INJECTION, SOLUTION SUBCUTANEOUS
Qty: 12 PEN | Refills: 0 | Status: SHIPPED | OUTPATIENT
Start: 2024-03-15 | End: 2024-03-26 | Stop reason: CLARIF

## 2024-03-15 RX ORDER — TIRZEPATIDE 2.5 MG/.5ML
2.5 INJECTION, SOLUTION SUBCUTANEOUS
Qty: 4 PEN | Refills: 0 | Status: SHIPPED | OUTPATIENT
Start: 2024-03-15 | End: 2024-03-15 | Stop reason: CLARIF

## 2024-03-15 RX ORDER — UBIDECARENONE 30 MG
200 CAPSULE ORAL 3 TIMES DAILY
COMMUNITY

## 2024-03-15 RX ORDER — LEVOTHYROXINE SODIUM 75 UG/1
75 TABLET ORAL
Qty: 90 TABLET | Refills: 3 | Status: SHIPPED | OUTPATIENT
Start: 2024-03-15 | End: 2025-03-16

## 2024-03-15 NOTE — ASSESSMENT & PLAN NOTE
Cont POC  Monitor  I discussed renal condition with patient at length  We discussed diet modification specifically decreasing salt intake, avoiding fast food ,avoiding fried food  We also discussed the importance of minimizing the use of anti-inflammatory agents like Motrin ibuprofen naproxen Aleve etc.  We discussed the importance of increasing fluid intake specifically water and also stay hydrated  We discussed minimizing the use of diuretics as much as clinically possible  We are going to monitor renal functions BUN creatinine urine microalbumin and electrolytes closely  To consider referral to Nephrology Service if renal fx continue to decline  To consider adding an SGL- 2 inhibitor if renal functions continue to decline, regardless of the blood sugar status

## 2024-03-15 NOTE — PROGRESS NOTES
Subjective:       Patient ID: Linda Gannon Mucha is a 73 y.o. female.    Chief Complaint: Follow-up (3 mth fu per labs and medication ), Diabetes, and Chronic Kidney Disease      Patient is established already .......... presents today for a f/u visit , to discuss labs results and for management of the chronic conditions.        Follow-up  This is a chronic problem. The current episode started more than 1 year ago. The problem occurs intermittently. The problem has been gradually improving. Associated symptoms include arthralgias, fatigue, myalgias and weakness. Pertinent negatives include no fever. The symptoms are aggravated by standing, twisting and walking. Treatments tried: Co Q-10. The treatment provided mild relief.   Diabetes  She presents for her follow-up diabetic visit. She has type 2 diabetes mellitus. The initial diagnosis of diabetes was made 20 years ago. Her disease course has been improving. There are no hypoglycemic associated symptoms. Associated symptoms include fatigue and weakness. There are no hypoglycemic complications. Symptoms are improving. Diabetic complications include nephropathy. Risk factors for coronary artery disease include diabetes mellitus, dyslipidemia, family history, obesity, hypertension, sedentary lifestyle, post-menopausal and stress. Current diabetic treatment includes oral agent (dual therapy) (GLP-1 agonist). Her weight is decreasing steadily. She is following a generally healthy diet. Meal planning includes avoidance of concentrated sweets. She has not had a previous visit with a dietitian. She participates in exercise intermittently. An ACE inhibitor/angiotensin II receptor blocker is being taken. She does not see a podiatrist.Eye exam is current.     Review of Systems   Constitutional:  Positive for fatigue. Negative for activity change, fever and unexpected weight change.   Respiratory: Negative.     Cardiovascular: Negative.    Musculoskeletal:  Positive for  arthralgias and myalgias.   Neurological:  Positive for weakness.         Objective:      Physical Exam  Vitals and nursing note reviewed.   Constitutional:       Appearance: Normal appearance. She is obese.   Cardiovascular:      Rate and Rhythm: Normal rate and regular rhythm.   Pulmonary:      Effort: Pulmonary effort is normal.      Breath sounds: Normal breath sounds.   Musculoskeletal:      Cervical back: Normal range of motion and neck supple.   Neurological:      Mental Status: She is alert.         Assessment:       1. Primary hypothyroidism  -     TSH; Future; Expected date: 03/15/2024    2. Type 2 diabetes mellitus with stage 3a chronic kidney disease, without long-term current use of insulin  Overview:  A1c improving  Renal fx declined    Orders:  -     tirzepatide (MOUNJARO) 2.5 mg/0.5 mL PnIj; Inject 2.5 mg into the skin every 7 days.  Dispense: 4 Pen; Refill: 0  -     Hemoglobin A1C; Future; Expected date: 03/15/2024    3. Chronic kidney disease (CKD) stage G3a/A1, moderately decreased glomerular filtration rate (GFR) between 45-59 mL/min/1.73 square meter and albuminuria creatinine ratio less than 30 mg/g  -     Comprehensive Metabolic Panel; Future; Expected date: 03/15/2024    4. Inadequately controlled diabetes mellitus  Overview:  In general , A1c is under good control but she suffers from diabetic nephropathy  GFR improved  I discussed renal condition with patient at length  We discussed diet modification specifically decreasing salt intake, avoiding fast food ,avoiding fried food  We also discussed the importance of minimizing the use of anti-inflammatory agents like Motrin ibuprofen naproxen Aleve etc.  We discussed the importance of increasing fluid intake specifically water and also stay hydrated  We discussed minimizing the use of diuretics as much as clinically possible  We are going to monitor renal functions BUN creatinine urine microalbumin and electrolytes closely  To consider referral  to Nephrology Service if renal fx continue to decline  To consider adding an SGL- 2 inhibitor if renal functions continue to decline, regardless of the blood sugar status      Assessment & Plan:  Controlled A1c  Diet , wt loss , exercise d/w patient  Monitor closely      5. Severe obesity (BMI 35.0-39.9) with comorbidity    6. Need for prophylactic vaccination and inoculation against influenza  -     Influenza (FLUAD) - Quadrivalent (Adjuvanted) *Preferred* (65+) (PF)    7. Stage 3a chronic kidney disease  Overview:  With stage III A chronic kidney disease  Stable    Assessment & Plan:  Cont POC  Monitor  I discussed renal condition with patient at length  We discussed diet modification specifically decreasing salt intake, avoiding fast food ,avoiding fried food  We also discussed the importance of minimizing the use of anti-inflammatory agents like Motrin ibuprofen naproxen Aleve etc.  We discussed the importance of increasing fluid intake specifically water and also stay hydrated  We discussed minimizing the use of diuretics as much as clinically possible  We are going to monitor renal functions BUN creatinine urine microalbumin and electrolytes closely  To consider referral to Nephrology Service if renal fx continue to decline  To consider adding an SGL- 2 inhibitor if renal functions continue to decline, regardless of the blood sugar status        8. High bilirubin  Overview:  Improving    Assessment & Plan:  Part of ORDOÑEZ ?  R/o Romeo's   Monitor closely      9. Preventative health care  Overview:  See below    Assessment & Plan:  Flu vaccine today  Get eye exam from [facility] in [place]  Get shingles , RSV      Other orders  -     levothyroxine (SYNTHROID) 75 MCG tablet; Take 1 tablet (75 mcg total) by mouth before breakfast.  Dispense: 90 tablet; Refill: 3           Plan:       1. Primary hypothyroidism  -     TSH; Future; Expected date: 03/15/2024    2. Type 2 diabetes mellitus with stage 3a chronic  kidney disease, without long-term current use of insulin  Overview:  A1c improving  Renal fx declined    Orders:  -     tirzepatide (MOUNJARO) 2.5 mg/0.5 mL PnIj; Inject 2.5 mg into the skin every 7 days.  Dispense: 4 Pen; Refill: 0  -     Hemoglobin A1C; Future; Expected date: 03/15/2024    3. Chronic kidney disease (CKD) stage G3a/A1, moderately decreased glomerular filtration rate (GFR) between 45-59 mL/min/1.73 square meter and albuminuria creatinine ratio less than 30 mg/g  -     Comprehensive Metabolic Panel; Future; Expected date: 03/15/2024    4. Inadequately controlled diabetes mellitus  Overview:  In general , A1c is under good control but she suffers from diabetic nephropathy  GFR improved  I discussed renal condition with patient at length  We discussed diet modification specifically decreasing salt intake, avoiding fast food ,avoiding fried food  We also discussed the importance of minimizing the use of anti-inflammatory agents like Motrin ibuprofen naproxen Aleve etc.  We discussed the importance of increasing fluid intake specifically water and also stay hydrated  We discussed minimizing the use of diuretics as much as clinically possible  We are going to monitor renal functions BUN creatinine urine microalbumin and electrolytes closely  To consider referral to Nephrology Service if renal fx continue to decline  To consider adding an SGL- 2 inhibitor if renal functions continue to decline, regardless of the blood sugar status      Assessment & Plan:  Controlled A1c  Diet , wt loss , exercise d/w patient  Monitor closely      5. Severe obesity (BMI 35.0-39.9) with comorbidity    6. Need for prophylactic vaccination and inoculation against influenza  -     Influenza (FLUAD) - Quadrivalent (Adjuvanted) *Preferred* (65+) (PF)    7. Stage 3a chronic kidney disease  Overview:  With stage III A chronic kidney disease  Stable    Assessment & Plan:  Cont POC  Monitor  I discussed renal condition with patient  at length  We discussed diet modification specifically decreasing salt intake, avoiding fast food ,avoiding fried food  We also discussed the importance of minimizing the use of anti-inflammatory agents like Motrin ibuprofen naproxen Aleve etc.  We discussed the importance of increasing fluid intake specifically water and also stay hydrated  We discussed minimizing the use of diuretics as much as clinically possible  We are going to monitor renal functions BUN creatinine urine microalbumin and electrolytes closely  To consider referral to Nephrology Service if renal fx continue to decline  To consider adding an SGL- 2 inhibitor if renal functions continue to decline, regardless of the blood sugar status        8. High bilirubin  Overview:  Improving    Assessment & Plan:  Part of ORDOÑEZ ?  R/o Romeo's   Monitor closely      9. Preventative health care  Overview:  See below    Assessment & Plan:  Flu vaccine today  Get eye exam from [facility] in [place]  Get shingles , RSV      Other orders  -     levothyroxine (SYNTHROID) 75 MCG tablet; Take 1 tablet (75 mcg total) by mouth before breakfast.  Dispense: 90 tablet; Refill: 3

## 2024-03-18 ENCOUNTER — PATIENT OUTREACH (OUTPATIENT)
Dept: ADMINISTRATIVE | Facility: HOSPITAL | Age: 74
End: 2024-03-18
Payer: MEDICARE

## 2024-03-18 NOTE — PROGRESS NOTES
Population Health Chart Review & Patient Outreach Details      Additional [facility] Notes:               Updates Requested / Reviewed:      Updated Care Coordination Note and          Health Maintenance Topics Overdue:      [facility] Score: 1     Eye Exam    Shingles/Zoster Vaccine  RSV Vaccine                  Health Maintenance Topic(s) Outreach Outcomes & Actions Taken:    Eye Exam - Outreach Outcomes & Actions Taken  : Diabetic Eye External Records Uploaded, Care Team & History Updated if Applicable

## 2024-03-26 RX ORDER — TIRZEPATIDE 2.5 MG/.5ML
2.5 INJECTION, SOLUTION SUBCUTANEOUS
Qty: 4 PEN | Refills: 0 | Status: SHIPPED | OUTPATIENT
Start: 2024-03-26 | End: 2024-04-23

## 2024-05-22 DIAGNOSIS — E11.65 TYPE 2 DIABETES MELLITUS WITH HYPERGLYCEMIA, WITHOUT LONG-TERM CURRENT USE OF INSULIN: Primary | ICD-10-CM

## 2024-05-22 RX ORDER — TIRZEPATIDE 2.5 MG/.5ML
2.5 INJECTION, SOLUTION SUBCUTANEOUS
Qty: 2 ML | Refills: 0 | Status: SHIPPED | OUTPATIENT
Start: 2024-05-22 | End: 2024-06-12 | Stop reason: SDUPTHER

## 2024-06-12 DIAGNOSIS — E11.65 TYPE 2 DIABETES MELLITUS WITH HYPERGLYCEMIA, WITHOUT LONG-TERM CURRENT USE OF INSULIN: ICD-10-CM

## 2024-06-12 RX ORDER — TIRZEPATIDE 2.5 MG/.5ML
2.5 INJECTION, SOLUTION SUBCUTANEOUS
Qty: 6 ML | Refills: 1 | Status: SHIPPED | OUTPATIENT
Start: 2024-06-12 | End: 2024-12-09

## 2024-06-14 ENCOUNTER — LAB VISIT (OUTPATIENT)
Dept: LAB | Facility: CLINIC | Age: 74
End: 2024-06-14
Payer: MEDICARE

## 2024-06-14 DIAGNOSIS — E03.9 PRIMARY HYPOTHYROIDISM: ICD-10-CM

## 2024-06-14 DIAGNOSIS — E11.22 TYPE 2 DIABETES MELLITUS WITH STAGE 3A CHRONIC KIDNEY DISEASE, WITHOUT LONG-TERM CURRENT USE OF INSULIN: ICD-10-CM

## 2024-06-14 DIAGNOSIS — N18.31 CHRONIC KIDNEY DISEASE (CKD) STAGE G3A/A1, MODERATELY DECREASED GLOMERULAR FILTRATION RATE (GFR) BETWEEN 45-59 ML/MIN/1.73 SQUARE METER AND ALBUMINURIA CREATININE RATIO LESS THAN 30 MG/G: ICD-10-CM

## 2024-06-14 DIAGNOSIS — E11.9 TYPE 2 DIABETES MELLITUS WITHOUT COMPLICATION: ICD-10-CM

## 2024-06-14 DIAGNOSIS — N18.31 TYPE 2 DIABETES MELLITUS WITH STAGE 3A CHRONIC KIDNEY DISEASE, WITHOUT LONG-TERM CURRENT USE OF INSULIN: ICD-10-CM

## 2024-06-14 LAB
ALBUMIN SERPL BCP-MCNC: 4.1 G/DL (ref 3.5–5.2)
ALP SERPL-CCNC: 104 U/L (ref 55–135)
ALT SERPL W/O P-5'-P-CCNC: 25 U/L (ref 10–44)
ANION GAP SERPL CALC-SCNC: 12 MMOL/L (ref 8–16)
AST SERPL-CCNC: 17 U/L (ref 10–40)
BILIRUB SERPL-MCNC: 1.3 MG/DL (ref 0.1–1)
BUN SERPL-MCNC: 34 MG/DL (ref 8–23)
CALCIUM SERPL-MCNC: 9.5 MG/DL (ref 8.7–10.5)
CHLORIDE SERPL-SCNC: 105 MMOL/L (ref 95–110)
CO2 SERPL-SCNC: 21 MMOL/L (ref 23–29)
CREAT SERPL-MCNC: 1.2 MG/DL (ref 0.5–1.4)
EST. GFR  (NO RACE VARIABLE): 47.2 ML/MIN/1.73 M^2
ESTIMATED AVG GLUCOSE: 117 MG/DL (ref 68–131)
GLUCOSE SERPL-MCNC: 117 MG/DL (ref 70–110)
HBA1C MFR BLD: 5.7 % (ref 4–5.6)
POTASSIUM SERPL-SCNC: 4.1 MMOL/L (ref 3.5–5.1)
PROT SERPL-MCNC: 7.8 G/DL (ref 6–8.4)
SODIUM SERPL-SCNC: 138 MMOL/L (ref 136–145)
TSH SERPL DL<=0.005 MIU/L-ACNC: 0.78 UIU/ML (ref 0.4–4)

## 2024-06-14 PROCEDURE — 83036 HEMOGLOBIN GLYCOSYLATED A1C: CPT | Performed by: INTERNAL MEDICINE

## 2024-06-14 PROCEDURE — 36415 COLL VENOUS BLD VENIPUNCTURE: CPT | Mod: ,,, | Performed by: INTERNAL MEDICINE

## 2024-06-14 PROCEDURE — 84443 ASSAY THYROID STIM HORMONE: CPT | Performed by: INTERNAL MEDICINE

## 2024-06-14 PROCEDURE — 80053 COMPREHEN METABOLIC PANEL: CPT | Performed by: INTERNAL MEDICINE

## 2024-06-17 PROBLEM — Z00.00 PREVENTATIVE HEALTH CARE: Status: RESOLVED | Noted: 2023-05-24 | Resolved: 2024-06-17

## 2024-06-28 ENCOUNTER — OFFICE VISIT (OUTPATIENT)
Dept: FAMILY MEDICINE | Facility: CLINIC | Age: 74
End: 2024-06-28
Payer: MEDICARE

## 2024-06-28 VITALS
TEMPERATURE: 98 F | HEART RATE: 79 BPM | BODY MASS INDEX: 35.94 KG/M2 | OXYGEN SATURATION: 95 % | DIASTOLIC BLOOD PRESSURE: 80 MMHG | HEIGHT: 61 IN | SYSTOLIC BLOOD PRESSURE: 124 MMHG | WEIGHT: 190.38 LBS

## 2024-06-28 DIAGNOSIS — L02.213 ABSCESS OF CHEST WALL: Primary | ICD-10-CM

## 2024-06-28 DIAGNOSIS — L98.9 SKIN LESION OF CHEST WALL: ICD-10-CM

## 2024-06-28 PROCEDURE — 87075 CULTR BACTERIA EXCEPT BLOOD: CPT | Performed by: INTERNAL MEDICINE

## 2024-06-28 PROCEDURE — 87205 SMEAR GRAM STAIN: CPT | Performed by: INTERNAL MEDICINE

## 2024-06-28 PROCEDURE — 87070 CULTURE OTHR SPECIMN AEROBIC: CPT | Performed by: INTERNAL MEDICINE

## 2024-06-28 RX ORDER — CLINDAMYCIN HYDROCHLORIDE 300 MG/1
300 CAPSULE ORAL 3 TIMES DAILY
Qty: 21 CAPSULE | Refills: 0 | Status: SHIPPED | OUTPATIENT
Start: 2024-06-28 | End: 2024-07-05

## 2024-06-28 RX ORDER — HYDROCODONE BITARTRATE AND ACETAMINOPHEN 5; 325 MG/1; MG/1
1 TABLET ORAL EVERY 6 HOURS PRN
Qty: 28 TABLET | Refills: 0 | Status: SHIPPED | OUTPATIENT
Start: 2024-06-28 | End: 2024-07-05

## 2024-06-28 NOTE — PROGRESS NOTES
Subjective:       Patient ID: Linda Gannon Mucha is a 73 y.o. female.    Chief Complaint: Cyst (Patient have cyst under her left side breast and lab result )      Patient is established already .......... presents today with an abscess like lesion of the left lateral chest wall    Cyst  This is a recurrent problem. The current episode started in the past 7 days. The problem occurs constantly. The problem has been rapidly worsening. Associated symptoms include chest pain. Pertinent negatives include no fever. Nothing aggravates the symptoms. She has tried nothing for the symptoms.     Review of Systems   Constitutional:  Negative for activity change, fever and unexpected weight change.   Respiratory: Negative.     Cardiovascular:  Positive for chest pain.   Integumentary:  Positive for mole/lesion.        Swelling tenderness and redness of the lateral lower chest wall on the left side   Neurological: Negative.          Objective:      Physical Exam  Constitutional:       Appearance: Normal appearance. She is obese.   Skin:     Comments: There is a golf size abscess like lesion on the lower chest of the left lateral part  The lesion is very tender erythematous with cystic consistency   Neurological:      Mental Status: She is alert.         Assessment:       1. Abscess of chest wall  Overview:  Recurrent abscess like lesion of the lower lateral left chest wall    Assessment & Plan:  Abscess of the left lower chest wall close to the mid axillary line  Procedure:  After local cleansing with iodine swabs, I injected 4 mL of 1% lidocaine into the abscess  Then using an a 15. Scalpel, I incised about 2 cm opening into the abscess cavity  I drained copious amounts of pustular material  We sent some of the 2 cultures  I then used an iodinated quarter of an inch packing reason.  I was able to insert about 5-7 inches of ribbon packing into the  I applied an antibiotic ointment and 3 4 x 4 pieces of gauze and secured in place  with paper tape      Other orders  -     clindamycin (CLEOCIN) 300 MG capsule; Take 1 capsule (300 mg total) by mouth 3 (three) times daily. for 7 days  Dispense: 21 capsule; Refill: 0  -     HYDROcodone-acetaminophen (NORCO) 5-325 mg per tablet; Take 1 tablet by mouth every 6 (six) hours as needed for Pain.  Dispense: 28 tablet; Refill: 0           Plan:       1. Abscess of chest wall  Overview:  Recurrent abscess like lesion of the lower lateral left chest wall    Assessment & Plan:  Abscess of the left lower chest wall close to the mid axillary line  Procedure:  After local cleansing with iodine swabs, I injected 4 mL of 1% lidocaine into the abscess  Then using an a 15. Scalpel, I incised about 2 cm opening into the abscess cavity  I drained copious amounts of pustular material  We sent some of the 2 cultures  I then used an iodinated quarter of an inch packing reason.  I was able to insert about 5-7 inches of ribbon packing into the  I applied an antibiotic ointment and 3 4 x 4 pieces of gauze and secured in place with paper tape      Other orders  -     clindamycin (CLEOCIN) 300 MG capsule; Take 1 capsule (300 mg total) by mouth 3 (three) times daily. for 7 days  Dispense: 21 capsule; Refill: 0  -     HYDROcodone-acetaminophen (NORCO) 5-325 mg per tablet; Take 1 tablet by mouth every 6 (six) hours as needed for Pain.  Dispense: 28 tablet; Refill: 0          I spent a total of 40 minutes on the day of the visit.  This includes face to face time and non-face to face time preparing to see the patient (eg, review of tests), obtaining and/or reviewing separately obtained history, documenting clinical information in the electronic or other health record, independently interpreting results and communicating results to the patient/family/caregiver, or care coordinator.

## 2024-06-28 NOTE — ASSESSMENT & PLAN NOTE
I and D of the lesion was done and described below  I gave the patient instructions for postprocedural skin care:  That include application of a topical antibiotic cream , 4 x 4 clean gauze and paper tape

## 2024-06-28 NOTE — ASSESSMENT & PLAN NOTE
Abscess of the left lower chest wall close to the mid axillary line  Procedure:  After local cleansing with iodine swabs, I injected 4 mL of 1% lidocaine into the abscess  Then using an a 15. Scalpel, I incised about 2 cm opening into the abscess cavity  I drained copious amounts of pustular material  We sent some of the 2 cultures  I then used an iodinated quarter of an inch packing reason.  I was able to insert about 5-7 inches of ribbon packing into the  I applied an antibiotic ointment and 3 4 x 4 pieces of gauze and secured in place with paper tape

## 2024-06-29 LAB
GRAM STN SPEC: NORMAL

## 2024-07-01 ENCOUNTER — OFFICE VISIT (OUTPATIENT)
Dept: FAMILY MEDICINE | Facility: CLINIC | Age: 74
End: 2024-07-01
Payer: MEDICARE

## 2024-07-01 VITALS
HEART RATE: 68 BPM | WEIGHT: 188 LBS | SYSTOLIC BLOOD PRESSURE: 110 MMHG | HEIGHT: 61 IN | DIASTOLIC BLOOD PRESSURE: 50 MMHG | RESPIRATION RATE: 20 BRPM | OXYGEN SATURATION: 96 % | BODY MASS INDEX: 35.5 KG/M2

## 2024-07-01 DIAGNOSIS — E78.2 MIXED HYPERLIPIDEMIA: Primary | ICD-10-CM

## 2024-07-01 DIAGNOSIS — I12.9 HYPERTENSIVE NEPHROPATHY: ICD-10-CM

## 2024-07-01 DIAGNOSIS — E11.22 TYPE 2 DIABETES MELLITUS WITH STAGE 3A CHRONIC KIDNEY DISEASE, WITHOUT LONG-TERM CURRENT USE OF INSULIN: ICD-10-CM

## 2024-07-01 DIAGNOSIS — L98.9 SKIN LESION OF CHEST WALL: ICD-10-CM

## 2024-07-01 DIAGNOSIS — N18.31 STAGE 3A CHRONIC KIDNEY DISEASE: ICD-10-CM

## 2024-07-01 DIAGNOSIS — N18.31 TYPE 2 DIABETES MELLITUS WITH STAGE 3A CHRONIC KIDNEY DISEASE, WITHOUT LONG-TERM CURRENT USE OF INSULIN: ICD-10-CM

## 2024-07-01 PROBLEM — N18.30 TYPE 2 DIABETES MELLITUS WITH STAGE 3 CHRONIC KIDNEY DISEASE: Status: ACTIVE | Noted: 2024-07-01

## 2024-07-01 PROCEDURE — G2211 COMPLEX E/M VISIT ADD ON: HCPCS | Mod: S$GLB,,, | Performed by: INTERNAL MEDICINE

## 2024-07-01 PROCEDURE — 99214 OFFICE O/P EST MOD 30 MIN: CPT | Mod: S$GLB,,, | Performed by: INTERNAL MEDICINE

## 2024-07-01 NOTE — ASSESSMENT & PLAN NOTE
I changed her packing today and dressing  Wound looks good  Minimal drainage  Healing abscess of the lower left chest wall

## 2024-07-01 NOTE — PROGRESS NOTES
Subjective:       Patient ID: Linda Gannon Mucha is a 73 y.o. female.    Chief Complaint: Follow-up, Wound Check (F/u wound check from Friday ), Diabetes, and Chronic Kidney Disease      Patient is established already .......... presents today for a f/u visit , to evaluate & manage DM, CKD and to change dressings on her abscess    Follow-up  This is a chronic problem. The current episode started more than 1 year ago. The problem occurs intermittently. The problem has been waxing and waning. Pertinent negatives include no fever. Nothing aggravates the symptoms. Treatments tried: See med list.   Wound Check  She was originally treated 10 to 14 days ago. Previous treatment included I&D of abscess, oral antibiotics and wound cleansing or irrigation. There has been no drainage from the wound. There is no redness present. There is no swelling present. The pain has improved.   Diabetes      Review of Systems   Constitutional:  Negative for activity change, fever and unexpected weight change.   Respiratory: Negative.     Cardiovascular: Negative.    Integumentary:  Positive for mole/lesion.        Pain and abscess cavity of L chest wall   Neurological: Negative.          Objective:      Physical Exam  Vitals and nursing note reviewed.   Constitutional:       Appearance: Normal appearance.   Cardiovascular:      Rate and Rhythm: Normal rate and regular rhythm.   Pulmonary:      Effort: Pulmonary effort is normal.      Breath sounds: Normal breath sounds.   Musculoskeletal:      Cervical back: Normal range of motion and neck supple.   Skin:     Comments: Healing abscess cavity of the left lower chest wall at the mid axillary line  There is no or minimal drain  No redness or swelling  Little tender  Size of the cavity is about 1 x 1 cm   Neurological:      Mental Status: She is alert.         Assessment:       1. Mixed hyperlipidemia  -     Lipid Panel; Future; Expected date: 07/01/2024    2. Type 2 diabetes mellitus with  stage 3a chronic kidney disease, without long-term current use of insulin    3. Skin lesion of chest wall  Overview:  Improving Abscess of the lateral left chest wall    Assessment & Plan:  I changed her packing today and dressing  Wound looks good  Minimal drainage  Healing abscess of the lower left chest wall      4. Stage 3a chronic kidney disease  Overview:  With stage III a chronic kidney disease  Slightly decreased GFR    Assessment & Plan:  I discussed renal condition with patient at length  We discussed diet modification specifically decreasing salt intake, avoiding fast food ,avoiding fried food  We also discussed the importance of minimizing the use of anti-inflammatory agents like Motrin ibuprofen naproxen Aleve etc.  We discussed the importance of increasing fluid intake specifically water and also stay hydrated  We discussed minimizing the use of diuretics as much as clinically possible  We are going to monitor renal functions BUN creatinine urine microalbumin and electrolytes closely  To consider referral to Nephrology Service if renal fx continue to decline  To consider adding an SGL- 2 inhibitor if renal functions continue to decline, regardless of the blood sugar status               Plan:       1. Mixed hyperlipidemia  -     Lipid Panel; Future; Expected date: 07/01/2024    2. Type 2 diabetes mellitus with stage 3a chronic kidney disease, without long-term current use of insulin    3. Skin lesion of chest wall  Overview:  Improving Abscess of the lateral left chest wall    Assessment & Plan:  I changed her packing today and dressing  Wound looks good  Minimal drainage  Healing abscess of the lower left chest wall      4. Stage 3a chronic kidney disease  Overview:  With stage III a chronic kidney disease  Slightly decreased GFR    Assessment & Plan:  I discussed renal condition with patient at length  We discussed diet modification specifically decreasing salt intake, avoiding fast food ,avoiding  fried food  We also discussed the importance of minimizing the use of anti-inflammatory agents like Motrin ibuprofen naproxen Aleve etc.  We discussed the importance of increasing fluid intake specifically water and also stay hydrated  We discussed minimizing the use of diuretics as much as clinically possible  We are going to monitor renal functions BUN creatinine urine microalbumin and electrolytes closely  To consider referral to Nephrology Service if renal fx continue to decline  To consider adding an SGL- 2 inhibitor if renal functions continue to decline, regardless of the blood sugar status

## 2024-07-12 RX ORDER — ROPINIROLE 4 MG/1
4 TABLET, FILM COATED ORAL NIGHTLY
Qty: 90 TABLET | Refills: 3 | Status: SHIPPED | OUTPATIENT
Start: 2024-07-12

## 2024-07-12 NOTE — TELEPHONE ENCOUNTER
Care Due:                  Date            Visit Type   Department     Provider  --------------------------------------------------------------------------------                                EP -                              PRIMARY      [facility] FAM[facility]  Last Visit: 07-      CARE ([facility])   JACKSON Manrique                              EP -                              PRIMARY      [facility] FAM[facility]  Next Visit: 07-      CARE ([facility])   [facility]       Steve Manrique                                                            Last  Test          Frequency    Reason                     Performed    Due Date  --------------------------------------------------------------------------------    Lipid Panel.  12 months..  ezetimibe, pravastatin...  05- 05-    [facility] Embedded Care Due Messages. Reference number: [id number].   7/12/2024 2:11:20 PM CDT

## 2024-07-12 NOTE — TELEPHONE ENCOUNTER
Refill Routing Note   Medication(s) are not appropriate for processing by [facility] for the following reason(s):        Outside of protocol    [facility] action(s):  Route               Appointments  past 12m or future 3m with PCP    Date Provider   Last Visit   7/1/2024 Steve Manrique MD   Next Visit   7/12/2024 Steve Manrique MD   ED visits in past 90 days: 0        Note composed:5:13 PM 07/12/2024

## 2024-07-13 RX ORDER — LEVOTHYROXINE SODIUM 75 UG/1
75 TABLET ORAL
Qty: 90 TABLET | Refills: 3 | Status: SHIPPED | OUTPATIENT
Start: 2024-07-13

## 2024-07-13 NOTE — TELEPHONE ENCOUNTER
Refill Routing Note   Medication(s) are not appropriate for processing by [facility] for the following reason(s):        No active prescription written by provider    [facility] action(s):  Defer               Appointments  past 12m or future 3m with PCP    Date Provider   Last Visit   7/1/2024 Steve Manrique MD   Next Visit   7/19/2024 Steve Manrique MD   ED visits in past 90 days: 0        Note composed:10:28 AM 07/13/2024

## 2024-07-13 NOTE — TELEPHONE ENCOUNTER
Refill Decision Note   Linda Mucha  is requesting a refill authorization.  Brief Assessment and Rationale for Refill:  Approve     Medication Therapy Plan:         Comments:     Note composed:6:26 AM 07/13/2024

## 2024-07-13 NOTE — TELEPHONE ENCOUNTER
No care due was identified.  [facility] Embedded Care Due Messages. Reference number: [id number].   7/13/2024 2:01:21 AM CDT

## 2024-07-15 RX ORDER — LOSARTAN POTASSIUM 50 MG/1
50 TABLET ORAL
Qty: 90 TABLET | Refills: 3 | Status: SHIPPED | OUTPATIENT
Start: 2024-07-15

## 2024-07-22 RX ORDER — METOPROLOL SUCCINATE 100 MG/1
100 TABLET, EXTENDED RELEASE ORAL
Qty: 90 TABLET | Refills: 3 | Status: SHIPPED | OUTPATIENT
Start: 2024-07-22

## 2024-07-22 NOTE — TELEPHONE ENCOUNTER
No care due was identified.  [facility] Embedded Care Due Messages. Reference number: [id number].   7/22/2024 2:28:34 AM CDT

## 2024-07-22 NOTE — TELEPHONE ENCOUNTER
Refill Routing Note   Medication(s) are not appropriate for processing by [facility] for the following reason(s):        Drug-disease interaction  Drug-Disease: metoprolol succinate and High bilirubin    [facility] action(s):  Defer               Appointments  past 12m or future 3m with PCP    Date Provider   Last Visit   7/1/2024 Steve Manrique MD   Next Visit   8/5/2024 Steve Manrique MD   ED visits in past 90 days: 0        Note composed:4:58 AM 07/22/2024

## 2024-07-25 ENCOUNTER — LAB VISIT (OUTPATIENT)
Dept: LAB | Facility: CLINIC | Age: 74
End: 2024-07-25
Payer: MEDICARE

## 2024-07-25 DIAGNOSIS — E78.2 MIXED HYPERLIPIDEMIA: ICD-10-CM

## 2024-07-25 LAB
CHOLEST SERPL-MCNC: 178 MG/DL (ref 120–199)
CHOLEST/HDLC SERPL: 3 {RATIO} (ref 2–5)
HDLC SERPL-MCNC: 59 MG/DL (ref 40–75)
HDLC SERPL: 33.1 % (ref 20–50)
LDLC SERPL CALC-MCNC: 79 MG/DL (ref 63–159)
NONHDLC SERPL-MCNC: 119 MG/DL
TRIGL SERPL-MCNC: 200 MG/DL (ref 30–150)

## 2024-07-25 PROCEDURE — 36415 COLL VENOUS BLD VENIPUNCTURE: CPT | Mod: ,,, | Performed by: INTERNAL MEDICINE

## 2024-07-25 PROCEDURE — 80061 LIPID PANEL: CPT | Performed by: INTERNAL MEDICINE

## 2024-08-06 ENCOUNTER — OFFICE VISIT (OUTPATIENT)
Dept: FAMILY MEDICINE | Facility: CLINIC | Age: 74
End: 2024-08-06
Payer: MEDICARE

## 2024-08-06 DIAGNOSIS — E11.22 TYPE 2 DIABETES MELLITUS WITH STAGE 3A CHRONIC KIDNEY DISEASE, WITHOUT LONG-TERM CURRENT USE OF INSULIN: ICD-10-CM

## 2024-08-06 DIAGNOSIS — N18.31 CHRONIC KIDNEY DISEASE (CKD) STAGE G3A/A1, MODERATELY DECREASED GLOMERULAR FILTRATION RATE (GFR) BETWEEN 45-59 ML/MIN/1.73 SQUARE METER AND ALBUMINURIA CREATININE RATIO LESS THAN 30 MG/G: ICD-10-CM

## 2024-08-06 DIAGNOSIS — I12.9 HYPERTENSIVE NEPHROPATHY: ICD-10-CM

## 2024-08-06 DIAGNOSIS — E11.65 TYPE 2 DIABETES MELLITUS WITH HYPERGLYCEMIA, WITHOUT LONG-TERM CURRENT USE OF INSULIN: ICD-10-CM

## 2024-08-06 DIAGNOSIS — I10 ESSENTIAL HYPERTENSION, BENIGN: Primary | ICD-10-CM

## 2024-08-06 DIAGNOSIS — Z76.0 MEDICATION REFILL: ICD-10-CM

## 2024-08-06 DIAGNOSIS — N18.31 TYPE 2 DIABETES MELLITUS WITH STAGE 3A CHRONIC KIDNEY DISEASE, WITHOUT LONG-TERM CURRENT USE OF INSULIN: ICD-10-CM

## 2024-08-06 PROBLEM — N18.30 TYPE 2 DIABETES MELLITUS WITH STAGE 3 CHRONIC KIDNEY DISEASE: Chronic | Status: ACTIVE | Noted: 2024-07-01

## 2024-08-06 PROCEDURE — G2211 COMPLEX E/M VISIT ADD ON: HCPCS | Mod: 95,,, | Performed by: INTERNAL MEDICINE

## 2024-08-06 PROCEDURE — 99213 OFFICE O/P EST LOW 20 MIN: CPT | Mod: 95,,, | Performed by: INTERNAL MEDICINE

## 2024-08-06 RX ORDER — TIRZEPATIDE 5 MG/.5ML
5 INJECTION, SOLUTION SUBCUTANEOUS
Qty: 6.5 ML | Refills: 0 | Status: SHIPPED | OUTPATIENT
Start: 2024-08-06 | End: 2024-11-04

## 2024-09-26 DIAGNOSIS — Z00.00 ENCOUNTER FOR MEDICARE ANNUAL WELLNESS EXAM: ICD-10-CM

## 2024-09-26 DIAGNOSIS — E78.2 MIXED HYPERLIPIDEMIA: ICD-10-CM

## 2024-09-26 RX ORDER — EZETIMIBE 10 MG/1
10 TABLET ORAL NIGHTLY
Qty: 90 TABLET | Refills: 2 | Status: SHIPPED | OUTPATIENT
Start: 2024-09-26

## 2024-09-26 NOTE — TELEPHONE ENCOUNTER
12/15/20 1019   Post-Acute Status   Post-Acute Authorization Home Health   Post-Acute Placement Status Referrals Sent   Sw sent hh orders to [facility]. Intake nurse states she is looking them over and will follow up.    Gissel Grimaldo LMSW  [facility]  [id number]      Refill Decision Note   Linda Mucha  is requesting a refill authorization.  Brief Assessment and Rationale for Refill:  Approve     Medication Therapy Plan:  FLOS 10.30.2024      Comments:     Note composed:10:48 AM 09/26/2024

## 2024-09-26 NOTE — TELEPHONE ENCOUNTER
Care Due:                  Date            Visit Type   Department     Provider  --------------------------------------------------------------------------------                                MYCHART                              SAME DAY                              VIRTUAL      [facility] FAMILY  Last Visit: 08-      VISIT        JACKSON Manrique                               -                              PRIMARY      [facility]  Next Visit: 11-      CARE ([facility])   MEDICINE       Steve Manrique                                                            Last  Test          Frequency    Reason                     Performed    Due Date  --------------------------------------------------------------------------------    HBA1C.......  6 months...  JARDIANCE, metFORMIN,      06- 12-                             tirzepatide..............    [employer] Embedded Care Due Messages. Reference number: [id number].   9/26/2024 10:33:56 AM CDT

## 2024-10-01 RX ORDER — METFORMIN HYDROCHLORIDE 500 MG/1
500 TABLET, EXTENDED RELEASE ORAL NIGHTLY
Qty: 90 TABLET | Refills: 0 | Status: SHIPPED | OUTPATIENT
Start: 2024-10-01

## 2024-10-01 NOTE — TELEPHONE ENCOUNTER
Refill Decision Note   Linda Mucha  is requesting a refill authorization.  Brief Assessment and Rationale for Refill:  Approve     Medication Therapy Plan:         Pharmacist review requested: Yes   Extended chart review required: Yes   Comments:     Note composed:2:01 PM 10/01/2024

## 2024-10-01 NOTE — TELEPHONE ENCOUNTER
Refill Routing Note   Medication(s) are not appropriate for processing by [facility] for the following reason(s):        Drug-disease interaction    [facility] action(s):  Defer      Medication Therapy Plan: High bilirubin    Pharmacist review requested: Yes     Appointments  past 12m or future 3m with PCP    Date Provider   Last Visit   8/6/2024 Steve Manrique MD   Next Visit   11/6/2024 Steve Manrique MD   ED visits in past 90 days: 0        Note composed:1:04 PM 10/01/2024

## 2024-10-01 NOTE — TELEPHONE ENCOUNTER
No care due was identified.  [facility] Embedded Care Due Messages. Reference number: [id number].   10/01/2024 2:27:26 AM CDT

## 2024-10-11 DIAGNOSIS — E11.65 TYPE 2 DIABETES MELLITUS WITH HYPERGLYCEMIA, WITHOUT LONG-TERM CURRENT USE OF INSULIN: ICD-10-CM

## 2024-10-11 RX ORDER — TIRZEPATIDE 5 MG/.5ML
INJECTION, SOLUTION SUBCUTANEOUS
Qty: 6 ML | Refills: 0 | Status: SHIPPED | OUTPATIENT
Start: 2024-10-11

## 2024-10-11 NOTE — TELEPHONE ENCOUNTER
No care due was identified.  [facility] Embedded Care Due Messages. Reference number: [id number].   10/11/2024 2:06:04 AM CDT

## 2024-10-11 NOTE — TELEPHONE ENCOUNTER
Refill Routing Note   Medication(s) are not appropriate for processing by [facility] for the following reason(s):        New or recently adjusted medication    [facility] action(s):  Defer        Medication Therapy Plan: Mounjaro 0.5mg new start (8/6/24)      Appointments  past 12m or future 3m with PCP    Date Provider   Last Visit   8/6/2024 Steve Manrique MD   Next Visit   11/6/2024 Steve Manrique MD   ED visits in past 90 days: 0        Note composed:9:18 AM 10/11/2024

## 2024-10-30 ENCOUNTER — LAB VISIT (OUTPATIENT)
Dept: LAB | Facility: CLINIC | Age: 74
End: 2024-10-30
Payer: MEDICARE

## 2024-10-30 DIAGNOSIS — I10 ESSENTIAL HYPERTENSION, BENIGN: ICD-10-CM

## 2024-10-30 DIAGNOSIS — E11.65 TYPE 2 DIABETES MELLITUS WITH HYPERGLYCEMIA, WITHOUT LONG-TERM CURRENT USE OF INSULIN: ICD-10-CM

## 2024-10-30 LAB
ALBUMIN SERPL BCP-MCNC: 4.3 G/DL (ref 3.5–5.2)
ALP SERPL-CCNC: 131 U/L (ref 40–150)
ALT SERPL W/O P-5'-P-CCNC: 27 U/L (ref 10–44)
ANION GAP SERPL CALC-SCNC: 14 MMOL/L (ref 8–16)
AST SERPL-CCNC: 18 U/L (ref 10–40)
BILIRUB SERPL-MCNC: 0.7 MG/DL (ref 0.1–1)
BUN SERPL-MCNC: 34 MG/DL (ref 8–23)
CALCIUM SERPL-MCNC: 9.7 MG/DL (ref 8.7–10.5)
CHLORIDE SERPL-SCNC: 104 MMOL/L (ref 95–110)
CO2 SERPL-SCNC: 20 MMOL/L (ref 23–29)
CREAT SERPL-MCNC: 1.3 MG/DL (ref 0.5–1.4)
EST. GFR  (NO RACE VARIABLE): 42.6 ML/MIN/1.73 M^2
ESTIMATED AVG GLUCOSE: 117 MG/DL (ref 68–131)
GLUCOSE SERPL-MCNC: 101 MG/DL (ref 70–110)
HBA1C MFR BLD: 5.7 % (ref 4–5.6)
POTASSIUM SERPL-SCNC: 4.2 MMOL/L (ref 3.5–5.1)
PROT SERPL-MCNC: 8.1 G/DL (ref 6–8.4)
SODIUM SERPL-SCNC: 138 MMOL/L (ref 136–145)

## 2024-10-30 PROCEDURE — 80053 COMPREHEN METABOLIC PANEL: CPT | Performed by: INTERNAL MEDICINE

## 2024-10-30 PROCEDURE — 83036 HEMOGLOBIN GLYCOSYLATED A1C: CPT | Performed by: INTERNAL MEDICINE

## 2024-11-07 ENCOUNTER — OFFICE VISIT (OUTPATIENT)
Dept: FAMILY MEDICINE | Facility: CLINIC | Age: 74
End: 2024-11-07
Payer: MEDICARE

## 2024-11-07 ENCOUNTER — TELEPHONE (OUTPATIENT)
Dept: FAMILY MEDICINE | Facility: CLINIC | Age: 74
End: 2024-11-07

## 2024-11-07 DIAGNOSIS — E78.2 MIXED HYPERLIPIDEMIA: ICD-10-CM

## 2024-11-07 DIAGNOSIS — I10 ESSENTIAL HYPERTENSION, BENIGN: ICD-10-CM

## 2024-11-07 DIAGNOSIS — E11.65 INADEQUATELY CONTROLLED DIABETES MELLITUS: ICD-10-CM

## 2024-11-07 DIAGNOSIS — N18.32 CHRONIC KIDNEY DISEASE (CKD) STAGE G3B/A2, MODERATELY DECREASED GLOMERULAR FILTRATION RATE (GFR) BETWEEN 30-44 ML/MIN/1.73 SQUARE METER AND ALBUMINURIA CREATININE RATIO BETWEEN 30-299 MG/G: Primary | ICD-10-CM

## 2024-11-07 NOTE — TELEPHONE ENCOUNTER
----- Message from Lydia sent at 11/7/2024  8:28 AM CST -----  Regarding: Same Day Appointment Reschedule Request  Contact: patient at [phone]  Type:  Same Day Appointment Reschedule Request    Name of Caller:  patient at [phone]    Additional Information:   calling to see if she could have a virtual/audio appointment today. Please call and advise. Thank you

## 2024-11-07 NOTE — PROGRESS NOTES
Subjective:       Patient ID: Linda Gannon Mucha is a 74 y.o. female.    Chief Complaint: Chronic Kidney Disease      History of Present Illness  Patient is established already .......... presents today for a f/u visit , via televideo  to discuss labs results and for management of the chronic conditions andrea CKD  She didn't voice any c/o today              Review of Systems   Constitutional:  Negative for activity change, fever and unexpected weight change.   Respiratory: Negative.     Cardiovascular: Negative.    Neurological: Negative.          Objective:      Physical Exam   deferred b/o telehealth  Assessment:       1. Chronic kidney disease (CKD) stage G3b/A2, moderately decreased glomerular filtration rate (GFR) between 30-44 mL/min/1.73 square meter and albuminuria creatinine ratio between  mg/g  Overview:  With stage III b chronic kidney disease  Slightly decreased GFR    Assessment & Plan:  I discussed renal condition with patient at length  We discussed diet modification specifically decreasing salt intake, avoiding fast food ,avoiding fried food  We also discussed the importance of minimizing the use of anti-inflammatory agents like Motrin ibuprofen naproxen Aleve etc.  We discussed the importance of increasing fluid intake specifically water and also stay hydrated  We discussed minimizing the use of diuretics as much as clinically possible  We are going to monitor renal functions BUN creatinine urine microalbumin and electrolytes closely  To consider referral to Nephrology Service if renal fx continue to decline  To consider adding an SGL- 2 inhibitor if renal functions continue to decline, regardless of the blood sugar status               Plan:       Assessment & Plan            Esther was seen today for chronic kidney disease.    Diagnoses and all orders for this visit:    Chronic kidney disease (CKD) stage G3b/A2, moderately decreased glomerular filtration rate (GFR) between 30-44 mL/min/1.73  square meter and albuminuria creatinine ratio between  mg/g      The patient location is: Home  The chief complaint leading to consultation is: CKD, labs review    Visit type: audiovisual    Face to Face time with patient: 14  21 minutes of total time spent on the encounter, which includes face to face time and non-face to face time preparing to see the patient (eg, review of tests), Obtaining and/or reviewing separately obtained history, Documenting clinical information in the electronic or other health record, Independently interpreting results (not separately reported) and communicating results to the patient/family/caregiver, or Care coordination (not separately reported).         Each patient to whom he or she provides medical services by telemedicine is:  (1) informed of the relationship between the physician and patient and the respective role of any other health care provider with respect to management of the patient; and (2) notified that he or she may decline to receive medical services by telemedicine and may withdraw from such care at any time.    Notes:  Monitor labs  Inc water intake  Dec use of diuretics  Cont jardiance

## 2024-11-13 LAB
LEFT EYE DM RETINOPATHY: NEGATIVE
RIGHT EYE DM RETINOPATHY: NEGATIVE

## 2024-12-23 RX ORDER — EMPAGLIFLOZIN 10 MG/1
10 TABLET, FILM COATED ORAL
Qty: 90 TABLET | Refills: 1 | Status: SHIPPED | OUTPATIENT
Start: 2024-12-23

## 2024-12-23 NOTE — TELEPHONE ENCOUNTER
No care due was identified.  [facility] Embedded Care Due Messages. Reference number: [id number].   12/23/2024 5:49:00 AM CST

## 2024-12-23 NOTE — TELEPHONE ENCOUNTER
Refill Routing Note   Medication(s) are not appropriate for processing by [facility] for the following reason(s):        Required vitals abnormal    [facility] action(s):  Defer               Appointments  past 12m or future 3m with PCP    Date Provider   Last Visit   11/7/2024 Steve Manrique MD   Next Visit   2/7/2025 Steve Manrique MD   ED visits in past 90 days: 0        Note composed:6:23 AM 12/23/2024

## 2024-12-30 RX ORDER — METFORMIN HYDROCHLORIDE 500 MG/1
500 TABLET, EXTENDED RELEASE ORAL NIGHTLY
Qty: 90 TABLET | Refills: 1 | Status: SHIPPED | OUTPATIENT
Start: 2024-12-30

## 2024-12-30 NOTE — TELEPHONE ENCOUNTER
No care due was identified.  [facility] Embedded Care Due Messages. Reference number: [id number].   12/30/2024 1:24:13 AM CST

## 2024-12-30 NOTE — TELEPHONE ENCOUNTER
Refill Decision Note   Linda Mucha  is requesting a refill authorization.  Brief Assessment and Rationale for Refill:  Approve     Medication Therapy Plan:  No dose adjustment recommended per renal fxn.       Comments:     Note composed:3:57 PM 12/30/2024

## 2025-01-03 DIAGNOSIS — E11.65 TYPE 2 DIABETES MELLITUS WITH HYPERGLYCEMIA, WITHOUT LONG-TERM CURRENT USE OF INSULIN: ICD-10-CM

## 2025-01-03 RX ORDER — TIRZEPATIDE 5 MG/.5ML
INJECTION, SOLUTION SUBCUTANEOUS
Qty: 6 ML | Refills: 3 | Status: SHIPPED | OUTPATIENT
Start: 2025-01-03

## 2025-01-03 NOTE — TELEPHONE ENCOUNTER
No care due was identified.  [facility] Embedded Care Due Messages. Reference number: [id number].   1/03/2025 1:28:10 AM CST  
Refill Routing Note   Medication(s) are not appropriate for processing by [facility] for the following reason(s):        Clarification of medication (Rx) details    [facility] action(s):  Defer    Would provider like to increase dose or continue same dose?         Appointments  past 12m or future 3m with PCP    Date Provider   Last Visit   11/7/2024 Steve Manrique MD   Next Visit   2/7/2025 Steve Manrique MD   ED visits in past 90 days: 0        Note composed:1:29 PM 01/03/2025          
Her/She

## 2025-02-06 ENCOUNTER — LAB VISIT (OUTPATIENT)
Dept: LAB | Facility: CLINIC | Age: 75
End: 2025-02-06
Payer: MEDICARE

## 2025-02-06 DIAGNOSIS — I10 ESSENTIAL HYPERTENSION, BENIGN: ICD-10-CM

## 2025-02-06 DIAGNOSIS — E78.2 MIXED HYPERLIPIDEMIA: ICD-10-CM

## 2025-02-06 DIAGNOSIS — E11.65 INADEQUATELY CONTROLLED DIABETES MELLITUS: ICD-10-CM

## 2025-02-06 LAB
ALBUMIN SERPL BCP-MCNC: 4.1 G/DL (ref 3.5–5.2)
ALBUMIN/CREAT UR: 31 UG/MG (ref 0–30)
ALP SERPL-CCNC: 126 U/L (ref 40–150)
ALT SERPL W/O P-5'-P-CCNC: 25 U/L (ref 10–44)
ANION GAP SERPL CALC-SCNC: 14 MMOL/L (ref 8–16)
AST SERPL-CCNC: 20 U/L (ref 10–40)
BILIRUB SERPL-MCNC: 1 MG/DL (ref 0.1–1)
BUN SERPL-MCNC: 24 MG/DL (ref 8–23)
CALCIUM SERPL-MCNC: 9.5 MG/DL (ref 8.7–10.5)
CHLORIDE SERPL-SCNC: 104 MMOL/L (ref 95–110)
CHOLEST SERPL-MCNC: 196 MG/DL (ref 120–199)
CHOLEST/HDLC SERPL: 3 {RATIO} (ref 2–5)
CO2 SERPL-SCNC: 22 MMOL/L (ref 23–29)
CREAT SERPL-MCNC: 1.1 MG/DL (ref 0.5–1.4)
CREAT UR-MCNC: 71 MG/DL (ref 15–325)
EST. GFR  (NO RACE VARIABLE): 52.1 ML/MIN/1.73 M^2
ESTIMATED AVG GLUCOSE: 123 MG/DL (ref 68–131)
GLUCOSE SERPL-MCNC: 106 MG/DL (ref 70–110)
HBA1C MFR BLD: 5.9 % (ref 4–5.6)
HDLC SERPL-MCNC: 65 MG/DL (ref 40–75)
HDLC SERPL: 33.2 % (ref 20–50)
LDLC SERPL CALC-MCNC: 98.2 MG/DL (ref 63–159)
MICROALBUMIN UR DL<=1MG/L-MCNC: 22 UG/ML
NONHDLC SERPL-MCNC: 131 MG/DL
POTASSIUM SERPL-SCNC: 4.3 MMOL/L (ref 3.5–5.1)
PROT SERPL-MCNC: 7.9 G/DL (ref 6–8.4)
SODIUM SERPL-SCNC: 140 MMOL/L (ref 136–145)
TRIGL SERPL-MCNC: 164 MG/DL (ref 30–150)

## 2025-02-06 PROCEDURE — 82043 UR ALBUMIN QUANTITATIVE: CPT | Performed by: INTERNAL MEDICINE

## 2025-02-06 PROCEDURE — 36415 COLL VENOUS BLD VENIPUNCTURE: CPT | Mod: ,,, | Performed by: INTERNAL MEDICINE

## 2025-02-06 PROCEDURE — 83036 HEMOGLOBIN GLYCOSYLATED A1C: CPT | Performed by: INTERNAL MEDICINE

## 2025-02-06 PROCEDURE — 80053 COMPREHEN METABOLIC PANEL: CPT | Performed by: INTERNAL MEDICINE

## 2025-02-06 PROCEDURE — 80061 LIPID PANEL: CPT | Performed by: INTERNAL MEDICINE

## 2025-02-20 ENCOUNTER — OFFICE VISIT (OUTPATIENT)
Dept: FAMILY MEDICINE | Facility: CLINIC | Age: 75
End: 2025-02-20
Payer: MEDICARE

## 2025-02-20 VITALS
SYSTOLIC BLOOD PRESSURE: 130 MMHG | OXYGEN SATURATION: 97 % | WEIGHT: 188 LBS | DIASTOLIC BLOOD PRESSURE: 70 MMHG | HEART RATE: 78 BPM | HEIGHT: 61 IN | BODY MASS INDEX: 35.5 KG/M2

## 2025-02-20 DIAGNOSIS — N18.31 CHRONIC KIDNEY DISEASE (CKD) STAGE G3A/A1, MODERATELY DECREASED GLOMERULAR FILTRATION RATE (GFR) BETWEEN 45-59 ML/MIN/1.73 SQUARE METER AND ALBUMINURIA CREATININE RATIO LESS THAN 30 MG/G: Primary | ICD-10-CM

## 2025-02-20 DIAGNOSIS — E11.9 DIABETES MELLITUS WITHOUT COMPLICATION: ICD-10-CM

## 2025-02-20 DIAGNOSIS — I10 ESSENTIAL HYPERTENSION, BENIGN: ICD-10-CM

## 2025-02-20 NOTE — ASSESSMENT & PLAN NOTE
Slightly elevated today on R side at 143/77  Monitor  Diet , wt loss , exercise d/w patient  Recheck in 4M

## 2025-02-20 NOTE — ASSESSMENT & PLAN NOTE
Monitor Cont GLP-1, SGL-2 inh  I discussed renal condition with patient at length  We discussed diet modification specifically decreasing salt intake, avoiding fast food ,avoiding fried food  We also discussed the importance of minimizing the use of anti-inflammatory agents like Motrin ibuprofen naproxen Aleve etc.  We discussed the importance of increasing fluid intake specifically water and also stay hydrated  We discussed minimizing the use of diuretics as much as clinically possible  We are going to monitor renal functions BUN creatinine urine microalbumin and electrolytes closely  To consider referral to Nephrology Service if renal fx continue to decline  To consider adding an SGL- 2 inhibitor if renal functions continue to decline, regardless of the blood sugar status

## 2025-02-20 NOTE — PROGRESS NOTES
Subjective:       Patient ID: Linda Gannon Mucha is a 74 y.o. female.    Chief Complaint: Results (Patient is here for lab work results. )      History of Present Illness    CHIEF COMPLAINT:  Esther presents for a follow-up visit to discuss recent lab results, particularly focusing on kidney function.    HPI:  Esther reports improvement in her kidney function, with her kidney count increasing by 10 points and normal creatinine level. She attributes this improvement to her recent habit of consuming blueberries every other day, which is the only significant change in her routine.    Her blood pressure was initially measured as 92/40, which she found concerning as it is typically in the range of  for the lower number. She had not taken her blood pressure medication that morning. Upon retaking, it was measured at 148/70, which she felt was more in line with her usual readings.    She reports significant weakness and severe fatigue when taking her blood pressure medication. She has more energy when she does not take the medication, allowing her to perform household chores while listening to music.    MEDICATIONS:  Esther is on Monjourno 0.5 mg monthly, with a 3-month supply consisting of 3 boxes, each containing 4 pens. She is also on Prevacol, Zetia, Jardens, and thyroid medication. For blood pressure management, she takes Losartan daily. She is on Metformin and Metoprolol.    MEDICAL HISTORY:  Esther has a history of chronic kidney disease, currently at Stage 3A (previously 3B). She also has a history of diabetes, hypertension, thyroid condition, and hypercholesterolemia.    TEST RESULTS:  Esther's recent eGFR has increased by 10 points, now at 52, indicating Stage 3A Chronic Kidney Disease. Her recent creatinine levels were normal. Her recent blood sugar was 5.9. Esther's cholesterol levels have increased by 20 points from the previous test. Esther is scheduled for an eye exam after Christmas, possibly in January, at  [facility] in [place]. This exam will include a dilated fundus exam.         Review of Systems   Constitutional:  Negative for activity change, fever and unexpected weight change.   Respiratory: Negative.     Cardiovascular: Negative.    Neurological: Negative.          Objective:      Physical Exam  Vitals and nursing note reviewed.   Constitutional:       Appearance: Normal appearance. She is obese.   Cardiovascular:      Rate and Rhythm: Normal rate and regular rhythm.   Pulmonary:      Effort: Pulmonary effort is normal.      Breath sounds: Normal breath sounds.   Musculoskeletal:      Cervical back: Normal range of motion and neck supple.   Neurological:      Mental Status: She is alert.         Assessment:       1. Chronic kidney disease (CKD) stage G3a/A1, moderately decreased glomerular filtration rate (GFR) between 45-59 mL/min/1.73 square meter and albuminuria creatinine ratio less than 30 mg/g  Overview:  Now at stage 3a chronic kidney disease  Much improved GFR    Assessment & Plan:  Monitor Cont GLP-1, SGL-2 inh  I discussed renal condition with patient at length  We discussed diet modification specifically decreasing salt intake, avoiding fast food ,avoiding fried food  We also discussed the importance of minimizing the use of anti-inflammatory agents like Motrin ibuprofen naproxen Aleve etc.  We discussed the importance of increasing fluid intake specifically water and also stay hydrated  We discussed minimizing the use of diuretics as much as clinically possible  We are going to monitor renal functions BUN creatinine urine microalbumin and electrolytes closely  To consider referral to Nephrology Service if renal fx continue to decline  To consider adding an SGL- 2 inhibitor if renal functions continue to decline, regardless of the blood sugar status        2. Essential hypertension, benign  Overview:  Usually controlled    Assessment & Plan:  Slightly elevated today on R side at  143/77  Monitor  Diet , wt loss , exercise d/w patient  Recheck in 4M      3. Diabetes mellitus without complication  Overview:  Usually at goal    Assessment & Plan:  Inc mounjaro for weight control, kidneys             Plan:       Assessment & Plan    IMPRESSION:  - Noted improvement in kidney function with eGFR increasing from stage 3B to 3A (now 52), approaching stage 2  - Evaluated blood pressure readings, noting fluctuation between low (92/40) and elevated (148/70) measurements  - Assessed need for blood pressure medication adjustment to protect kidney function    TYPE 2 DIABETES MELLITUS WITH DIABETIC CHRONIC KIDNEY DISEASE:  - Monitored kidney function, noting improvement with eGFR increasing to 52 (stage 3A CKD) and normal creatinine levels.  - Discussed potential contributing factors, including blueberry consumption.  - Continued Metformin at current dose.  - Maintained HbA1c at 5.9.  - Planned to continue current medications, including Mounjaro (tirzepatide) at 0.5mg for 3 more months, then increase to 7.5mg.  - Other medications (Prevacol, Zetia, Jardiance, thyroid medication, Losartan, and Metoprolol) remain unchanged.    SEVERE OBESITY (BMI 35.0-39.9) WITH COMORBIDITY:  - Continued Mounjaro (tirzepatide) at 0.5mg for weight management.  - Planned to increase dose to 7.5mg at the next visit.    CHRONIC KIDNEY DISEASE (CKD) STAGE G3B/A2, MODERATELY DECREASED GFR BETWEEN 30-44 ML/MIN/1.73 SQUARE METER AND ALBUMINURIA CREATININE RATIO BETWEEN  MG/G:  - Noted improvement from stage 3B to stage 3A CKD, with eGFR now at 52, approaching stage 2.  - Explained the relationship between elevated blood pressure and kidney damage, emphasizing the importance of consistent blood pressure medication use for kidney protection.  - Discussed the potential positive impact of increased blueberry consumption on kidney function improvement.    NON-PRESSURE CHRONIC ULCER OF SKIN OF OTHER SITES WITH UNSPECIFIED  SEVERITY:  - Evaluated the patient's reported skin issues, possibly related to chronic ulcers.    HYPERLIPIDEMIA:  - Noted a 20-point increase in cholesterol levels, possibly due to holiday eating habits.    HYPERTENSION:  - Monitored blood pressure readings, noting inconsistencies: initially 92/40, then 148/17, and finally 130/70.  - Evaluated that the blood pressure is high at 140, which is concerning as it should be 120.    THYROID DISORDER:  - This section has been removed as it is redundant with information in Note A.    OTHER INSTRUCTIONS:  - This section has been removed as it is redundant with information in Note A.       Esther was seen today for results.    Diagnoses and all orders for this visit:    Chronic kidney disease (CKD) stage G3a/A1, moderately decreased glomerular filtration rate (GFR) between 45-59 mL/min/1.73 square meter and albuminuria creatinine ratio less than 30 mg/g    Essential hypertension, benign    Diabetes mellitus without complication      Visit today included increased complexity associated with the care of the episodic problem.   I addressed and managing the longitudinal care of the patient due to the serious and/or complex managed problem(s).  .        I spent a total of 30 minutes on the day of the visit.  This includes face to face time and non-face to face time preparing to see the patient (eg, review of tests), obtaining and/or reviewing separately obtained history, documenting clinical information in the electronic or other health record, independently interpreting results and communicating results to the patient/family/caregiver, or care coordinator.

## 2025-02-21 ENCOUNTER — PATIENT OUTREACH (OUTPATIENT)
Dept: ADMINISTRATIVE | Facility: HOSPITAL | Age: 75
End: 2025-02-21
Payer: MEDICARE

## 2025-02-21 NOTE — PROGRESS NOTES
Population Health Chart Review & Patient Outreach Details      Additional [facility] Health Notes:               Updates Requested / Reviewed:      Updated Care Coordination Note         Health Maintenance Topics Overdue:      VB Score: 0     Patient is not due for any topics at this time.    Shingles/Zoster Vaccine  RSV Vaccine                  Health Maintenance Topic(s) Outreach Outcomes & Actions Taken:    Eye Exam - Outreach Outcomes & Actions Taken  : External Records Requested & Care Team Updated if Applicable

## 2025-02-21 NOTE — LETTER
AUTHORIZATION FOR RELEASE OF   CONFIDENTIAL INFORMATION    Dear Dr Lundy,    We are seeing Linda Gannon Mucha, date of birth 1950, in the clinic at [facility]. Steve Manrique MD is the patient's PCP. Linda Gannon Mucha has an outstanding lab/procedure at the time we reviewed her chart. In order to help keep her health information updated, she has authorized us to request the following medical record(s):        (  )  MAMMOGRAM                                      (  )  COLONOSCOPY      (  )  PAP SMEAR                                          (  )  OUTSIDE LAB RESULTS     (  )  DEXA SCAN                                          ( X )  EYE EXAM            (  )  FOOT EXAM                                          (  )  ENTIRE RECORD     (  )  OUTSIDE IMMUNIZATIONS                 (  )  _______________         Please fax records to [facility], Gorgi-Mikhail, Magdy, MD at [phone]    Thanks so much and have a great day!    Carla Watson LPN [address] [place] [address]  - [phone]   [phone]           Patient Name: Linda Gannon Mucha  : 1950  Patient Phone #: [phone]

## 2025-02-22 DIAGNOSIS — Z00.00 ENCOUNTER FOR MEDICARE ANNUAL WELLNESS EXAM: ICD-10-CM

## 2025-03-06 ENCOUNTER — OFFICE VISIT (OUTPATIENT)
Dept: FAMILY MEDICINE | Facility: CLINIC | Age: 75
End: 2025-03-06
Payer: MEDICARE

## 2025-03-06 VITALS
HEIGHT: 61 IN | SYSTOLIC BLOOD PRESSURE: 120 MMHG | BODY MASS INDEX: 34.36 KG/M2 | DIASTOLIC BLOOD PRESSURE: 60 MMHG | OXYGEN SATURATION: 96 % | WEIGHT: 182 LBS | HEART RATE: 70 BPM

## 2025-03-06 DIAGNOSIS — L20.89 OTHER ATOPIC DERMATITIS: ICD-10-CM

## 2025-03-06 DIAGNOSIS — E11.65 INADEQUATELY CONTROLLED DIABETES MELLITUS: Primary | ICD-10-CM

## 2025-03-06 DIAGNOSIS — I10 ESSENTIAL HYPERTENSION, BENIGN: ICD-10-CM

## 2025-03-06 RX ORDER — BETAMETHASONE DIPROPIONATE 0.5 MG/G
1 LOTION TOPICAL 2 TIMES DAILY
COMMUNITY
End: 2025-03-06 | Stop reason: SDUPTHER

## 2025-03-06 RX ORDER — BETAMETHASONE DIPROPIONATE 0.5 MG/G
1 LOTION TOPICAL 2 TIMES DAILY
Qty: 60 ML | Refills: 2 | Status: SHIPPED | OUTPATIENT
Start: 2025-03-06 | End: 2025-06-04

## 2025-03-06 NOTE — PROGRESS NOTES
Subjective:       Patient ID: Linda Gannon Mucha is a 74 y.o. female.    Chief Complaint: Hypertension (Patient is here for elevated blood pressure. )      History of Present Illness              Review of Systems   Constitutional:  Negative for activity change, fever and unexpected weight change.   Respiratory: Negative.     Cardiovascular: Negative.    Integumentary:  Positive for mole/lesion.        Scaling, itching of ext ear canals   Neurological: Negative.          Objective:      Physical Exam  Vitals and nursing note reviewed.   Constitutional:       Appearance: Normal appearance.   Cardiovascular:      Rate and Rhythm: Normal rate and regular rhythm.   Pulmonary:      Effort: Pulmonary effort is normal.      Breath sounds: Normal breath sounds.   Musculoskeletal:      Cervical back: Normal range of motion and neck supple.   Skin:     Comments: Mild scaling of ext ear canals   Neurological:      Mental Status: She is alert.         Assessment:       1. Inadequately controlled diabetes mellitus  -     Comprehensive Metabolic Panel; Future; Expected date: 03/06/2025  -     Hemoglobin A1C; Future; Expected date: 03/06/2025    2. Other atopic dermatitis  Overview:  Affecting ext ear canals    Assessment & Plan:  Refill betamethasone lotion      3. Essential hypertension, benign  Overview:  Usually controlled    Assessment & Plan:  Today : 120/60  Cont efforts at wt loss, diet      Other orders  -     betamethasone dipropionate (BETANATE) 0.05 % lotion; Apply 1 Application topically 2 (two) times daily.  Dispense: 60 mL; Refill: 2           Plan:       Assessment & Plan            Esther was seen today for hypertension.    Diagnoses and all orders for this visit:    Inadequately controlled diabetes mellitus  -     Comprehensive Metabolic Panel; Future  -     Hemoglobin A1C; Future    Other atopic dermatitis    Essential hypertension, benign    Other orders  -     betamethasone dipropionate (BETANATE) 0.05 %  lotion; Apply 1 Application topically 2 (two) times daily.          Visit today included increased complexity associated with the care of the episodic problem.   I addressed and managing the longitudinal care of the patient due to the serious and/or complex managed problem(s).  .

## 2025-03-06 NOTE — ASSESSMENT & PLAN NOTE
Refill betamethasone lotion  
Today : 120/60  Cont efforts at wt loss, diet  
Left knee arthroscopy/yes

## 2025-03-24 RX ORDER — PRAVASTATIN SODIUM 40 MG/1
40 TABLET ORAL NIGHTLY
Qty: 90 TABLET | Refills: 3 | Status: SHIPPED | OUTPATIENT
Start: 2025-03-24

## 2025-03-24 NOTE — TELEPHONE ENCOUNTER
Care Due:                  Date            Visit Type   Department     Provider  --------------------------------------------------------------------------------                                EP -                              PRIMARY      [facility]  Last Visit: 03-      CARE ([facility])   JACKSON Manrique                              EP -                              PRIMARY      [facility]  Next Visit: 05-      CARE ([facility])   JACKSON Manrique                                                            Last  Test          Frequency    Reason                     Performed    Due Date  --------------------------------------------------------------------------------    TSH.........  12 months..  levothyroxine............  06- 06-    [facility] Embedded Care Due Messages. Reference number: [id number].   3/24/2025 2:32:19 AM CDT

## 2025-03-24 NOTE — TELEPHONE ENCOUNTER
Refill Routing Note   Medication(s) are not appropriate for processing by [facility] for the following reason(s):        No active prescription written by provider    [facility] action(s):  Defer     Requires labs : Yes      Medication Therapy Plan: Prescription ; DEFER      Appointments  past 12m or future 3m with PCP    Date Provider   Last Visit   3/6/2025 Steve Manrique MD   Next Visit   2025 Steve Manrique MD   ED visits in past 90 days: 0        Note composed:3:26 PM 2025

## 2025-04-25 DIAGNOSIS — Z12.31 OTHER SCREENING MAMMOGRAM: Primary | ICD-10-CM

## 2025-05-19 PROCEDURE — 83036 HEMOGLOBIN GLYCOSYLATED A1C: CPT | Performed by: INTERNAL MEDICINE

## 2025-05-19 PROCEDURE — 80053 COMPREHEN METABOLIC PANEL: CPT | Performed by: INTERNAL MEDICINE

## 2025-05-20 ENCOUNTER — OFFICE VISIT (OUTPATIENT)
Dept: FAMILY MEDICINE | Facility: CLINIC | Age: 75
End: 2025-05-20
Payer: MEDICARE

## 2025-05-20 VITALS
OXYGEN SATURATION: 97 % | HEART RATE: 78 BPM | DIASTOLIC BLOOD PRESSURE: 80 MMHG | SYSTOLIC BLOOD PRESSURE: 120 MMHG | HEIGHT: 61 IN | WEIGHT: 183.44 LBS | BODY MASS INDEX: 34.63 KG/M2

## 2025-05-20 DIAGNOSIS — R53.83 FATIGUE, UNSPECIFIED TYPE: ICD-10-CM

## 2025-05-20 DIAGNOSIS — Z00.00 PREVENTATIVE HEALTH CARE: ICD-10-CM

## 2025-05-20 DIAGNOSIS — E11.22 TYPE 2 DIABETES MELLITUS WITH STAGE 3A CHRONIC KIDNEY DISEASE, WITHOUT LONG-TERM CURRENT USE OF INSULIN: ICD-10-CM

## 2025-05-20 DIAGNOSIS — I10 ESSENTIAL HYPERTENSION, BENIGN: ICD-10-CM

## 2025-05-20 DIAGNOSIS — E11.65 TYPE 2 DIABETES MELLITUS WITH HYPERGLYCEMIA, WITHOUT LONG-TERM CURRENT USE OF INSULIN: ICD-10-CM

## 2025-05-20 DIAGNOSIS — N18.31 STAGE 3A CHRONIC KIDNEY DISEASE: ICD-10-CM

## 2025-05-20 DIAGNOSIS — N18.31 TYPE 2 DIABETES MELLITUS WITH STAGE 3A CHRONIC KIDNEY DISEASE, WITHOUT LONG-TERM CURRENT USE OF INSULIN: ICD-10-CM

## 2025-05-20 DIAGNOSIS — E11.65 INADEQUATELY CONTROLLED DIABETES MELLITUS: Primary | ICD-10-CM

## 2025-05-20 DIAGNOSIS — M81.0 OSTEOPOROSIS, POST-MENOPAUSAL: ICD-10-CM

## 2025-05-20 PROCEDURE — G2211 COMPLEX E/M VISIT ADD ON: HCPCS | Mod: S$GLB,,, | Performed by: INTERNAL MEDICINE

## 2025-05-20 PROCEDURE — 99214 OFFICE O/P EST MOD 30 MIN: CPT | Mod: S$GLB,,, | Performed by: INTERNAL MEDICINE

## 2025-05-20 RX ORDER — TIRZEPATIDE 7.5 MG/.5ML
7.5 INJECTION, SOLUTION SUBCUTANEOUS
Qty: 6 ML | Refills: 0 | Status: SHIPPED | OUTPATIENT
Start: 2025-05-20 | End: 2025-08-12

## 2025-05-20 NOTE — PROGRESS NOTES
Subjective:       Patient ID: Linda Gannon Mucha is a 74 y.o. female.    Chief Complaint: Health Maintenance      History of Present Illness              Review of Systems   Constitutional:  Negative for activity change, fever and unexpected weight change.   Respiratory: Negative.     Cardiovascular: Negative.    Neurological:  Positive for numbness.        With tingling of r ant thigh         Objective:      Physical Exam  Vitals and nursing note reviewed.   Constitutional:       Appearance: Normal appearance. She is obese.   Cardiovascular:      Rate and Rhythm: Normal rate and regular rhythm.   Pulmonary:      Effort: Pulmonary effort is normal.      Breath sounds: Normal breath sounds.   Musculoskeletal:      Cervical back: Normal range of motion and neck supple.   Neurological:      General: No focal deficit present.      Mental Status: She is alert.         Assessment:       1. Inadequately controlled diabetes mellitus  -     tirzepatide (MOUNJARO) 7.5 mg/0.5 mL PnIj; Inject 7.5 mg into the skin every 7 days.  Dispense: 6 mL; Refill: 0  -     Hemoglobin A1C; Future; Expected date: 05/20/2025    2. Osteoporosis, post-menopausal  -     DXA Bone Density Axial Skeleton 1 or more sites; Future; Expected date: 05/20/2025    3. Essential hypertension, benign  Overview:  Usually controlled    Orders:  -     Comprehensive Metabolic Panel; Future; Expected date: 05/20/2025    4. Fatigue, unspecified type  -     TSH; Future; Expected date: 05/20/2025    5. Preventative health care  Overview:  See below    Assessment & Plan:  Order DEXA  Get RSV vaccine  Diet , wt loss , exercise d/w patient  Monitor A1c, lipids      6. Stage 3a chronic kidney disease  Overview:  Now at stage 3a chronic kidney disease  Much improved GFR    Assessment & Plan:  I discussed renal condition with patient at length  We discussed diet modification specifically decreasing salt intake, avoiding fast food ,avoiding fried food  We also discussed the  importance of minimizing the use of anti-inflammatory agents like Motrin ibuprofen naproxen Aleve etc.  We discussed the importance of increasing fluid intake specifically water and also stay hydrated  We discussed minimizing the use of diuretics as much as clinically possible  We are going to monitor renal functions BUN creatinine urine microalbumin and electrolytes closely  To consider referral to Nephrology Service if renal fx continue to decline  To consider adding an SGL- 2 inhibitor if renal functions continue to decline, regardless of the blood sugar status        7. Type 2 diabetes mellitus with hyperglycemia, without long-term current use of insulin  Overview:  In general , A1c is under good control but she suffers from diabetic nephropathy  GFR improved  I discussed renal condition with patient at length  We discussed diet modification specifically decreasing salt intake, avoiding fast food ,avoiding fried food  We also discussed the importance of minimizing the use of anti-inflammatory agents like Motrin ibuprofen naproxen Aleve etc.  We discussed the importance of increasing fluid intake specifically water and also stay hydrated  We discussed minimizing the use of diuretics as much as clinically possible  We are going to monitor renal functions BUN creatinine urine microalbumin and electrolytes closely  To consider referral to Nephrology Service if renal fx continue to decline  To consider adding an SGL- 2 inhibitor if renal functions continue to decline, regardless of the blood sugar status      Assessment & Plan:  Inc mounjro  Diet , wt loss , exercise d/w patient        8. Type 2 diabetes mellitus with stage 3a chronic kidney disease, without long-term current use of insulin  Overview:  A1c improving  Renal fx : better    Assessment & Plan:  Cont SGL-2 inh  Monitor  Healthy diet, exercise  Avoid NSAIDs             Plan:       Assessment & Plan            Esther was seen today for health  maintenance.    Diagnoses and all orders for this visit:    Inadequately controlled diabetes mellitus  -     tirzepatide (MOUNJARO) 7.5 mg/0.5 mL PnIj; Inject 7.5 mg into the skin every 7 days.  -     Hemoglobin A1C; Future    Osteoporosis, post-menopausal  -     DXA Bone Density Axial Skeleton 1 or more sites; Future    Essential hypertension, benign  -     Comprehensive Metabolic Panel; Future    Fatigue, unspecified type  -     TSH; Future    Preventative health care    Stage 3a chronic kidney disease    Type 2 diabetes mellitus with hyperglycemia, without long-term current use of insulin    Type 2 diabetes mellitus with stage 3a chronic kidney disease, without long-term current use of insulin      Visit today included increased complexity associated with the care of the episodic problem.   I addressed and managing the longitudinal care of the patient due to the serious and/or complex managed problem(s).  .

## 2025-06-06 ENCOUNTER — TELEPHONE (OUTPATIENT)
Dept: FAMILY MEDICINE | Facility: CLINIC | Age: 75
End: 2025-06-06
Payer: MEDICARE

## 2025-06-11 ENCOUNTER — TELEPHONE (OUTPATIENT)
Dept: FAMILY MEDICINE | Facility: CLINIC | Age: 75
End: 2025-06-11

## 2025-06-20 ENCOUNTER — HOSPITAL ENCOUNTER (OUTPATIENT)
Dept: RADIOLOGY | Facility: HOSPITAL | Age: 75
Discharge: HOME | End: 2025-06-20
Attending: INTERNAL MEDICINE
Payer: MEDICARE

## 2025-06-20 DIAGNOSIS — Z12.31 OTHER SCREENING MAMMOGRAM: ICD-10-CM

## 2025-06-20 DIAGNOSIS — M81.0 OSTEOPOROSIS, POST-MENOPAUSAL: ICD-10-CM

## 2025-06-20 PROCEDURE — 77067 SCR MAMMO BI INCL CAD: CPT | Mod: 26,,, | Performed by: RADIOLOGY

## 2025-06-20 PROCEDURE — 77080 DXA BONE DENSITY AXIAL: CPT | Mod: 26,,, | Performed by: RADIOLOGY

## 2025-06-20 PROCEDURE — 77092 TBS I&R FX RSK QHP: CPT | Mod: ,,, | Performed by: RADIOLOGY

## 2025-06-20 PROCEDURE — 77067 SCR MAMMO BI INCL CAD: CPT | Mod: TC

## 2025-06-20 PROCEDURE — 77091 TBS TECHL CALCULATION ONLY: CPT

## 2025-06-20 PROCEDURE — 77063 BREAST TOMOSYNTHESIS BI: CPT | Mod: 26,,, | Performed by: RADIOLOGY

## 2025-07-07 ENCOUNTER — TELEPHONE (OUTPATIENT)
Dept: FAMILY MEDICINE | Facility: CLINIC | Age: 75
End: 2025-07-07
Payer: MEDICARE

## 2025-07-07 NOTE — TELEPHONE ENCOUNTER
Copied from CRM #[id number]. Topic: Appointments - Appointment Scheduling  >> Jul 7, 2025 12:15 PM Tiffanie wrote:  Type: Patient Call Back    Who called: Patient     What is the request in detail: Pt is requesting a call back and a message left in her portal to schedule an appt with the provider. Please advise     Can the clinic reply by [facility]? Yes     Would the patient rather a call back or a response via [facility]?  Call and  [facility]    Best call back number: [phone]     Additional Information:

## 2025-07-14 ENCOUNTER — OFFICE VISIT (OUTPATIENT)
Dept: FAMILY MEDICINE | Facility: CLINIC | Age: 75
End: 2025-07-14
Payer: MEDICARE

## 2025-07-14 VITALS
WEIGHT: 177.56 LBS | SYSTOLIC BLOOD PRESSURE: 122 MMHG | OXYGEN SATURATION: 96 % | DIASTOLIC BLOOD PRESSURE: 76 MMHG | HEIGHT: 61 IN | HEART RATE: 71 BPM | BODY MASS INDEX: 33.52 KG/M2

## 2025-07-14 DIAGNOSIS — E11.22 TYPE 2 DIABETES MELLITUS WITH STAGE 3A CHRONIC KIDNEY DISEASE, WITHOUT LONG-TERM CURRENT USE OF INSULIN: Primary | ICD-10-CM

## 2025-07-14 DIAGNOSIS — Z87.891 FORMER SMOKER: ICD-10-CM

## 2025-07-14 DIAGNOSIS — I10 ESSENTIAL HYPERTENSION, BENIGN: ICD-10-CM

## 2025-07-14 DIAGNOSIS — E03.9 ACQUIRED HYPOTHYROIDISM: ICD-10-CM

## 2025-07-14 DIAGNOSIS — E78.2 MIXED HYPERLIPIDEMIA: ICD-10-CM

## 2025-07-14 DIAGNOSIS — N18.31 TYPE 2 DIABETES MELLITUS WITH STAGE 3A CHRONIC KIDNEY DISEASE, WITHOUT LONG-TERM CURRENT USE OF INSULIN: Primary | ICD-10-CM

## 2025-07-14 PROBLEM — N18.30 TYPE 2 DIABETES MELLITUS WITH STAGE 3 CHRONIC KIDNEY DISEASE, WITHOUT LONG-TERM CURRENT USE OF INSULIN: Status: RESOLVED | Noted: 2023-09-13 | Resolved: 2025-07-14

## 2025-07-14 PROBLEM — E11.9 DIABETES MELLITUS WITHOUT COMPLICATION: Status: RESOLVED | Noted: 2025-02-20 | Resolved: 2025-07-14

## 2025-07-14 PROCEDURE — G2211 COMPLEX E/M VISIT ADD ON: HCPCS | Mod: ,,, | Performed by: FAMILY MEDICINE

## 2025-07-14 PROCEDURE — 99214 OFFICE O/P EST MOD 30 MIN: CPT | Mod: S$PBB,,, | Performed by: FAMILY MEDICINE

## 2025-07-14 PROCEDURE — 99999 PR PBB SHADOW E&M-EST. PATIENT-LVL IV: CPT | Mod: PBBFAC,,, | Performed by: FAMILY MEDICINE

## 2025-07-14 PROCEDURE — 99214 OFFICE O/P EST MOD 30 MIN: CPT | Mod: PBBFAC,PN | Performed by: FAMILY MEDICINE

## 2025-07-14 RX ORDER — TIRZEPATIDE 7.5 MG/.5ML
7.5 INJECTION, SOLUTION SUBCUTANEOUS
Qty: 6 ML | Refills: 3 | Status: CANCELLED | OUTPATIENT
Start: 2025-07-14 | End: 2025-10-06

## 2025-07-14 NOTE — PROGRESS NOTES
Subjective     Patient ID: Linda Gannon Mucha is a 74 y.o. female.    Chief Complaint: Establish Care (Pt is here to establish care/needs medicare wellness exam scheduled as well)    Visit today included increased complexity associated with the care of the episodic problem listed below addressed and managing the longitudinal care of the patient due to the serious and/or complex managed problem(s) listed below.     HTN, mixed hyperlipidemia, diabetes, CKD stage IIIA and peripheral edema: takes torsemide 10mg TID.  States she in her PCP have been adjusting her medications to try to preserve her kidney function in this combination seems to work well.  Patient also compliant with metformin Jardiance and Mounjaro.  Patient states she tried Trulicity in the past with no improvement in diabetic control so they were able to switch her to Mounjaro.  Hypothyroid: Stable with medication      Review of Systems   Constitutional:  Negative for activity change, appetite change, chills, diaphoresis, fatigue, fever and unexpected weight change.   Respiratory:  Negative for cough, choking and chest tightness.    Cardiovascular:  Negative for chest pain, palpitations, leg swelling and claudication.   Gastrointestinal:  Negative for abdominal pain, change in bowel habit, constipation, diarrhea, nausea and vomiting.   Psychiatric/Behavioral:  Negative for dysphoric mood, self-injury, sleep disturbance and suicidal ideas. The patient is not nervous/anxious.           Objective     Physical Exam  Vitals reviewed.   Constitutional:       General: She is not in acute distress.     Appearance: Normal appearance. She is obese. She is not ill-appearing or toxic-appearing.   Cardiovascular:      Rate and Rhythm: Normal rate and regular rhythm.      Heart sounds: Normal heart sounds.   Pulmonary:      Effort: Pulmonary effort is normal.      Breath sounds: Normal breath sounds.   Abdominal:      General: Abdomen is flat. Bowel sounds are normal.       Palpations: Abdomen is soft.      Tenderness: There is no abdominal tenderness.   Neurological:      General: No focal deficit present.      Mental Status: She is alert and oriented to person, place, and time.   Psychiatric:         Mood and Affect: Mood normal.         Behavior: Behavior normal.            Assessment and Plan     1. Type 2 diabetes mellitus with stage 3a chronic kidney disease, without long-term current use of insulin  Overview:  A1c improving  Renal fx : better    Orders:  -     tirzepatide 10 mg/0.5 mL PnIj; Inject 10 mg into the skin every 7 days. Cancel the 7.5 mg rx  Dispense: 6 mL; Refill: 0  -     Hemoglobin A1C; Future; Expected date: 07/14/2025  -     Cancel: Microalbumin/Creatinine Ratio, Urine; Future  -     Microalbumin/Creatinine Ratio, Urine; Future    2. Acquired hypothyroidism  -     TSH; Future; Expected date: 07/14/2025    3. Essential hypertension, benign  Overview:  Usually controlled    Orders:  -     Comprehensive Metabolic Panel; Future; Expected date: 07/14/2025    4. Mixed hyperlipidemia  -     Lipid Panel; Future; Expected date: 07/14/2025    5. Former smoker        Diabetes, hypertension, hypothyroid, hyperlipidemia, CKD stage IIIA:  All stable.  We will check labs today.  We will increase tirzepatide.  We will consider increasing Jardiance once patient gets her labs back.  We will schedule her for August in contact patient to go over them and then place patient back on a three-month recall here at this office  Risks, benefits, and side effects were discussed with the patient. All questions were answered to the fullest satisfaction of the patient, and pt verbalized understanding and agreement to treatment plan. Pt was to call with any new or worsening symptoms, or present to the ER.         Jacy Weiss MD  Family Medicine Physician   [facility]     This note was created using M*auctionpoint voice recognition software that occasionally may  misinterpret phrases or words.

## 2025-07-18 ENCOUNTER — OFFICE VISIT (OUTPATIENT)
Dept: FAMILY MEDICINE | Facility: CLINIC | Age: 75
End: 2025-07-18
Payer: MEDICARE

## 2025-07-18 VITALS
WEIGHT: 178.38 LBS | HEIGHT: 61 IN | HEART RATE: 68 BPM | BODY MASS INDEX: 33.68 KG/M2 | OXYGEN SATURATION: 97 % | DIASTOLIC BLOOD PRESSURE: 64 MMHG | SYSTOLIC BLOOD PRESSURE: 112 MMHG | RESPIRATION RATE: 14 BRPM

## 2025-07-18 DIAGNOSIS — Z00.00 ENCOUNTER FOR MEDICARE ANNUAL WELLNESS EXAM: Primary | ICD-10-CM

## 2025-07-18 DIAGNOSIS — Z00.00 ENCOUNTER FOR PREVENTATIVE ADULT HEALTH CARE EXAMINATION: ICD-10-CM

## 2025-07-18 DIAGNOSIS — E11.22 TYPE 2 DIABETES MELLITUS WITH STAGE 3A CHRONIC KIDNEY DISEASE, WITHOUT LONG-TERM CURRENT USE OF INSULIN: ICD-10-CM

## 2025-07-18 DIAGNOSIS — I10 ESSENTIAL HYPERTENSION, BENIGN: ICD-10-CM

## 2025-07-18 DIAGNOSIS — E03.9 ACQUIRED HYPOTHYROIDISM: ICD-10-CM

## 2025-07-18 DIAGNOSIS — N18.31 TYPE 2 DIABETES MELLITUS WITH STAGE 3A CHRONIC KIDNEY DISEASE, WITHOUT LONG-TERM CURRENT USE OF INSULIN: ICD-10-CM

## 2025-07-18 PROBLEM — I12.9 HYPERTENSIVE KIDNEY DISEASE, STAGE III: Status: ACTIVE | Noted: 2020-06-10

## 2025-07-18 PROBLEM — N18.30 HYPERTENSIVE KIDNEY DISEASE, STAGE III: Status: ACTIVE | Noted: 2020-06-10

## 2025-07-18 PROBLEM — G25.81 RESTLESS LEGS SYNDROME: Status: ACTIVE | Noted: 2025-07-18

## 2025-07-18 PROCEDURE — 99214 OFFICE O/P EST MOD 30 MIN: CPT | Mod: PBBFAC,PN | Performed by: NURSE PRACTITIONER

## 2025-07-18 PROCEDURE — 99999 PR PBB SHADOW E&M-EST. PATIENT-LVL IV: CPT | Mod: PBBFAC,,, | Performed by: NURSE PRACTITIONER

## 2025-07-18 NOTE — PATIENT INSTRUCTIONS
Counseling and Referral of Other Preventative  (Italic type indicates deductible and co-insurance are waived)    Patient Name: Linda Mucha  Today's Date: 7/18/2025    Health Maintenance       Date Due Completion Date    RSV Vaccine (Age 60+ and Pregnant patients) (1 - 1-dose 75+ series) Never done ---    Shingles Vaccine (2 of 2) 06/30/2025 5/5/2025    Influenza Vaccine (1) 09/01/2025 8/28/2024    Diabetic Eye Exam 11/13/2025 11/13/2024 (Done)    Override on 11/13/2024: Done    Hemoglobin A1c 11/19/2025 5/19/2025    Diabetes Urine Screening 02/06/2026 2/6/2025    Lipid Panel 02/06/2026 2/6/2025    DEXA Scan 06/20/2027 6/20/2025    TETANUS VACCINE 11/15/2029 11/15/2019        No orders of the defined types were placed in this encounter.    The following information is provided to all patients.  This information is to help you find resources for any of the problems found today that may be affecting your health:                  Living healthy guide: ms.gov    Understanding Diabetes: www.diabetes.org      Eating healthy: www.cdc.gov/healthyweight      CDC home safety checklist: www.cdc.gov/steadi/patient.html      Agency on Aging: [place].gov    Alcoholics anonymous (AA): www.aa.org      Physical Activity: www.lexi.nih.gov/gq2ovfl      Tobacco use: ms.gov

## 2025-07-18 NOTE — PROGRESS NOTES
"  Linda Mucha presented for a follow-up Medicare AWV today. The following components were reviewed and updated:    Medical history  Family History  Social history  Allergies and Current Medications  Health Risk Assessment  Health Maintenance  Care Team    **See Completed Assessments for Annual Wellness visit with in the encounter summary    The following assessments were completed:  Depression Screening  Cognitive function Screening  Timed Get Up Test  Whisper Test      Opioid documentation:      Patient does not have a current opioid prescription.          Vitals:    07/18/25 1415   BP: 112/64   BP Location: Left arm   Patient Position: Sitting   Pulse: 68   Resp: 14   SpO2: 97%   Weight: 80.9 kg (178 lb 6.4 oz)   Height: 5' 1" (1.549 m)     Body mass index is 33.71 kg/m².       Physical Exam  Constitutional:       General: She is not in acute distress.     Appearance: Normal appearance.   HENT:      Head: Normocephalic.   Eyes:      Conjunctiva/sclera: Conjunctivae normal.      Comments: Corrective lenses    Cardiovascular:      Rate and Rhythm: Normal rate.   Pulmonary:      Effort: Pulmonary effort is normal. No respiratory distress.   Musculoskeletal:      Right lower leg: No edema.      Left lower leg: No edema.   Skin:     General: Skin is warm.      Coloration: Skin is not jaundiced.      Findings: No bruising or rash.   Neurological:      Mental Status: She is alert and oriented to person, place, and time.   Psychiatric:         Mood and Affect: Mood normal.         Behavior: Behavior normal.         Thought Content: Thought content normal.         Judgment: Judgment normal.       Diagnoses and health risks identified today and associated recommendations/orders:  1. Encounter for Medicare annual wellness exam    2. Encounter for preventative adult health care examination  Screenings performed as noted above. Personal preventative testing needs reviewed.   3. Acquired hypothyroidism  Stable. Monitored by " PCP. Continue current medication/treatment regimen     4. Type 2 diabetes mellitus with stage 3a chronic kidney disease, without long-term current use of insulin  Stable. Monitored by PCP. Continue current medication/treatment regimen     5. Essential hypertension, benign  Stable. Monitored by PCP. Continue current medication/treatment regimen       Provided Esther with a 5-10 year written screening schedule and personal prevention plan. Recommendations were developed using the USPSTF age appropriate recommendations. Education, counseling, and referrals were provided as needed.  After Visit Summary printed and given to patient which includes a list of additional screenings\tests needed.    Follow up in about 1 year (around 7/18/2026) for your next annual wellness visit.      Marielena Arango NP

## 2025-08-19 ENCOUNTER — LAB VISIT (OUTPATIENT)
Dept: LAB | Facility: HOSPITAL | Age: 75
End: 2025-08-19
Payer: MEDICARE

## 2025-08-19 DIAGNOSIS — I10 ESSENTIAL HYPERTENSION, BENIGN: ICD-10-CM

## 2025-08-19 DIAGNOSIS — E11.22 TYPE 2 DIABETES MELLITUS WITH STAGE 3A CHRONIC KIDNEY DISEASE, WITHOUT LONG-TERM CURRENT USE OF INSULIN: ICD-10-CM

## 2025-08-19 DIAGNOSIS — E03.9 ACQUIRED HYPOTHYROIDISM: ICD-10-CM

## 2025-08-19 DIAGNOSIS — E78.2 MIXED HYPERLIPIDEMIA: ICD-10-CM

## 2025-08-19 DIAGNOSIS — N18.31 TYPE 2 DIABETES MELLITUS WITH STAGE 3A CHRONIC KIDNEY DISEASE, WITHOUT LONG-TERM CURRENT USE OF INSULIN: ICD-10-CM

## 2025-08-19 LAB
ALBUMIN SERPL BCP-MCNC: 4.2 G/DL (ref 3.5–5.2)
ALBUMIN/CREAT UR: 17.2 UG/MG
ALP SERPL-CCNC: 136 UNIT/L (ref 40–150)
ALT SERPL W/O P-5'-P-CCNC: 21 UNIT/L (ref 10–44)
ANION GAP (OHS): 10 MMOL/L (ref 8–16)
AST SERPL-CCNC: 21 UNIT/L (ref 11–45)
BILIRUB SERPL-MCNC: 0.7 MG/DL (ref 0.1–1)
BUN SERPL-MCNC: 29 MG/DL (ref 8–23)
CALCIUM SERPL-MCNC: 9.5 MG/DL (ref 8.7–10.5)
CHLORIDE SERPL-SCNC: 104 MMOL/L (ref 95–110)
CHOLEST SERPL-MCNC: 152 MG/DL (ref 120–199)
CHOLEST/HDLC SERPL: 2.5 {RATIO} (ref 2–5)
CO2 SERPL-SCNC: 25 MMOL/L (ref 23–29)
CREAT SERPL-MCNC: 1.3 MG/DL (ref 0.5–1.4)
CREAT UR-MCNC: 128 MG/DL (ref 15–325)
EAG (OHS): 117 MG/DL (ref 68–131)
GFR SERPLBLD CREATININE-BSD FMLA CKD-EPI: 43 ML/MIN/1.73/M2
GLUCOSE SERPL-MCNC: 100 MG/DL (ref 70–110)
HBA1C MFR BLD: 5.7 % (ref 4–5.6)
HDLC SERPL-MCNC: 61 MG/DL (ref 40–75)
HDLC SERPL: 40.1 % (ref 20–50)
LDLC SERPL CALC-MCNC: 60.4 MG/DL (ref 63–159)
MICROALBUMIN UR-MCNC: 22 UG/ML
NONHDLC SERPL-MCNC: 91 MG/DL
POTASSIUM SERPL-SCNC: 3.9 MMOL/L (ref 3.5–5.1)
PROT SERPL-MCNC: 7.9 GM/DL (ref 6–8.4)
SODIUM SERPL-SCNC: 139 MMOL/L (ref 136–145)
TRIGL SERPL-MCNC: 153 MG/DL (ref 30–150)
TSH SERPL-ACNC: 0.87 UIU/ML (ref 0.4–4)

## 2025-08-19 PROCEDURE — 36415 COLL VENOUS BLD VENIPUNCTURE: CPT | Mod: PN

## 2025-08-19 PROCEDURE — 82570 ASSAY OF URINE CREATININE: CPT

## 2025-08-19 PROCEDURE — 83036 HEMOGLOBIN GLYCOSYLATED A1C: CPT

## 2025-08-19 PROCEDURE — 80061 LIPID PANEL: CPT

## 2025-08-19 PROCEDURE — 80053 COMPREHEN METABOLIC PANEL: CPT

## 2025-08-19 PROCEDURE — 84443 ASSAY THYROID STIM HORMONE: CPT

## 2025-08-20 ENCOUNTER — RESULTS FOLLOW-UP (OUTPATIENT)
Dept: FAMILY MEDICINE | Facility: CLINIC | Age: 75
End: 2025-08-20
Payer: MEDICARE

## 2025-08-20 DIAGNOSIS — E11.22 TYPE 2 DIABETES MELLITUS WITH STAGE 3A CHRONIC KIDNEY DISEASE, WITHOUT LONG-TERM CURRENT USE OF INSULIN: Primary | ICD-10-CM

## 2025-08-20 DIAGNOSIS — N18.31 TYPE 2 DIABETES MELLITUS WITH STAGE 3A CHRONIC KIDNEY DISEASE, WITHOUT LONG-TERM CURRENT USE OF INSULIN: Primary | ICD-10-CM
